# Patient Record
Sex: FEMALE | Race: WHITE | NOT HISPANIC OR LATINO | Employment: UNEMPLOYED | URBAN - METROPOLITAN AREA
[De-identification: names, ages, dates, MRNs, and addresses within clinical notes are randomized per-mention and may not be internally consistent; named-entity substitution may affect disease eponyms.]

---

## 2017-02-13 ENCOUNTER — OFFICE VISIT - HEALTHEAST (OUTPATIENT)
Dept: FAMILY MEDICINE | Facility: CLINIC | Age: 57
End: 2017-02-13

## 2017-02-13 DIAGNOSIS — J40 BRONCHITIS: ICD-10-CM

## 2017-03-29 ENCOUNTER — COMMUNICATION - HEALTHEAST (OUTPATIENT)
Dept: FAMILY MEDICINE | Facility: CLINIC | Age: 57
End: 2017-03-29

## 2017-05-19 ENCOUNTER — COMMUNICATION - HEALTHEAST (OUTPATIENT)
Dept: FAMILY MEDICINE | Facility: CLINIC | Age: 57
End: 2017-05-19

## 2017-05-19 DIAGNOSIS — E11.9 DM (DIABETES MELLITUS) (H): ICD-10-CM

## 2017-05-27 ENCOUNTER — COMMUNICATION - HEALTHEAST (OUTPATIENT)
Dept: FAMILY MEDICINE | Facility: CLINIC | Age: 57
End: 2017-05-27

## 2017-05-27 DIAGNOSIS — I63.9 CVA (CEREBRAL VASCULAR ACCIDENT) (H): ICD-10-CM

## 2017-05-27 DIAGNOSIS — G40.909 SEIZURE DISORDER (H): ICD-10-CM

## 2017-05-27 DIAGNOSIS — I77.6 VASCULITIS (H): ICD-10-CM

## 2017-05-27 DIAGNOSIS — E11.9 DIABETES MELLITUS, TYPE 2 (H): ICD-10-CM

## 2017-05-27 DIAGNOSIS — E78.5 HYPERLIPIDEMIA: ICD-10-CM

## 2017-06-05 ENCOUNTER — COMMUNICATION - HEALTHEAST (OUTPATIENT)
Dept: FAMILY MEDICINE | Facility: CLINIC | Age: 57
End: 2017-06-05

## 2017-06-05 DIAGNOSIS — E11.9 DIABETES MELLITUS, TYPE 2 (H): ICD-10-CM

## 2017-06-05 DIAGNOSIS — G40.909 SEIZURE DISORDER (H): ICD-10-CM

## 2017-06-06 ENCOUNTER — COMMUNICATION - HEALTHEAST (OUTPATIENT)
Dept: FAMILY MEDICINE | Facility: CLINIC | Age: 57
End: 2017-06-06

## 2017-06-13 ENCOUNTER — OFFICE VISIT - HEALTHEAST (OUTPATIENT)
Dept: FAMILY MEDICINE | Facility: CLINIC | Age: 57
End: 2017-06-13

## 2017-06-13 ENCOUNTER — AMBULATORY - HEALTHEAST (OUTPATIENT)
Dept: FAMILY MEDICINE | Facility: CLINIC | Age: 57
End: 2017-06-13

## 2017-06-13 DIAGNOSIS — E78.5 HYPERLIPIDEMIA: ICD-10-CM

## 2017-06-13 DIAGNOSIS — E11.9 TYPE 2 DIABETES MELLITUS (H): ICD-10-CM

## 2017-06-13 DIAGNOSIS — E11.9 DM (DIABETES MELLITUS) (H): ICD-10-CM

## 2017-06-13 DIAGNOSIS — I77.6 VASCULITIS (H): ICD-10-CM

## 2017-06-13 DIAGNOSIS — I63.9 CVA (CEREBRAL VASCULAR ACCIDENT) (H): ICD-10-CM

## 2017-06-13 DIAGNOSIS — G40.909 SEIZURE DISORDER (H): ICD-10-CM

## 2017-06-13 LAB
CHOLEST SERPL-MCNC: 237 MG/DL
FASTING STATUS PATIENT QL REPORTED: YES
HBA1C MFR BLD: 6.5 % (ref 3.5–6)
HDLC SERPL-MCNC: 51 MG/DL
LDLC SERPL CALC-MCNC: 156 MG/DL
TRIGL SERPL-MCNC: 150 MG/DL

## 2017-06-13 RX ORDER — LEVETIRACETAM 500 MG/1
500 TABLET ORAL 2 TIMES DAILY
Qty: 180 TABLET | Refills: 3 | Status: SHIPPED | OUTPATIENT
Start: 2017-06-13

## 2017-06-20 ENCOUNTER — COMMUNICATION - HEALTHEAST (OUTPATIENT)
Dept: FAMILY MEDICINE | Facility: CLINIC | Age: 57
End: 2017-06-20

## 2017-06-21 ENCOUNTER — COMMUNICATION - HEALTHEAST (OUTPATIENT)
Dept: LAB | Facility: CLINIC | Age: 57
End: 2017-06-21

## 2017-06-21 DIAGNOSIS — E83.52 HYPERCALCEMIA: ICD-10-CM

## 2017-07-03 ENCOUNTER — AMBULATORY - HEALTHEAST (OUTPATIENT)
Dept: LAB | Facility: CLINIC | Age: 57
End: 2017-07-03

## 2017-07-03 DIAGNOSIS — E83.52 HYPERCALCEMIA: ICD-10-CM

## 2017-07-05 ENCOUNTER — COMMUNICATION - HEALTHEAST (OUTPATIENT)
Dept: FAMILY MEDICINE | Facility: CLINIC | Age: 57
End: 2017-07-05

## 2017-07-06 ENCOUNTER — COMMUNICATION - HEALTHEAST (OUTPATIENT)
Dept: FAMILY MEDICINE | Facility: CLINIC | Age: 57
End: 2017-07-06

## 2017-07-28 ENCOUNTER — RECORDS - HEALTHEAST (OUTPATIENT)
Dept: ADMINISTRATIVE | Facility: OTHER | Age: 57
End: 2017-07-28

## 2017-09-22 ENCOUNTER — COMMUNICATION - HEALTHEAST (OUTPATIENT)
Dept: FAMILY MEDICINE | Facility: CLINIC | Age: 57
End: 2017-09-22

## 2017-09-22 DIAGNOSIS — E11.9 DM (DIABETES MELLITUS) (H): ICD-10-CM

## 2017-11-07 ENCOUNTER — OFFICE VISIT - HEALTHEAST (OUTPATIENT)
Dept: FAMILY MEDICINE | Facility: CLINIC | Age: 57
End: 2017-11-07

## 2017-11-07 ENCOUNTER — COMMUNICATION - HEALTHEAST (OUTPATIENT)
Dept: SCHEDULING | Facility: CLINIC | Age: 57
End: 2017-11-07

## 2017-11-07 DIAGNOSIS — A46 ERYSIPELAS: ICD-10-CM

## 2017-12-13 ENCOUNTER — RECORDS - HEALTHEAST (OUTPATIENT)
Dept: ADMINISTRATIVE | Facility: OTHER | Age: 57
End: 2017-12-13

## 2017-12-15 ENCOUNTER — OFFICE VISIT - HEALTHEAST (OUTPATIENT)
Dept: FAMILY MEDICINE | Facility: CLINIC | Age: 57
End: 2017-12-15

## 2017-12-15 ENCOUNTER — AMBULATORY - HEALTHEAST (OUTPATIENT)
Dept: FAMILY MEDICINE | Facility: CLINIC | Age: 57
End: 2017-12-15

## 2017-12-15 DIAGNOSIS — G40.909 SEIZURE DISORDER (H): ICD-10-CM

## 2017-12-15 DIAGNOSIS — E11.9 TYPE 2 DIABETES MELLITUS (H): ICD-10-CM

## 2017-12-15 DIAGNOSIS — R13.10 DYSPHAGIA: ICD-10-CM

## 2017-12-15 DIAGNOSIS — I63.9 CVA (CEREBRAL VASCULAR ACCIDENT) (H): ICD-10-CM

## 2017-12-15 DIAGNOSIS — E78.5 HYPERLIPIDEMIA: ICD-10-CM

## 2017-12-15 DIAGNOSIS — I77.6 VASCULITIS (H): ICD-10-CM

## 2017-12-15 LAB — HBA1C MFR BLD: 6.1 % (ref 3.5–6)

## 2017-12-22 ENCOUNTER — HOSPITAL ENCOUNTER (OUTPATIENT)
Dept: RADIOLOGY | Facility: HOSPITAL | Age: 57
Discharge: HOME | End: 2017-12-22
Attending: FAMILY MEDICINE

## 2017-12-22 DIAGNOSIS — R13.10 DYSPHAGIA: ICD-10-CM

## 2017-12-22 DIAGNOSIS — I63.9 CVA (CEREBRAL VASCULAR ACCIDENT) (H): ICD-10-CM

## 2017-12-26 ENCOUNTER — COMMUNICATION - HEALTHEAST (OUTPATIENT)
Dept: FAMILY MEDICINE | Facility: CLINIC | Age: 57
End: 2017-12-26

## 2018-03-12 ENCOUNTER — COMMUNICATION - HEALTHEAST (OUTPATIENT)
Dept: LAB | Facility: CLINIC | Age: 58
End: 2018-03-12

## 2018-03-12 DIAGNOSIS — E11.9 TYPE 2 DIABETES MELLITUS (H): ICD-10-CM

## 2018-03-20 ENCOUNTER — COMMUNICATION - HEALTHEAST (OUTPATIENT)
Dept: FAMILY MEDICINE | Facility: CLINIC | Age: 58
End: 2018-03-20

## 2018-03-20 ENCOUNTER — AMBULATORY - HEALTHEAST (OUTPATIENT)
Dept: LAB | Facility: CLINIC | Age: 58
End: 2018-03-20

## 2018-03-20 DIAGNOSIS — E11.9 TYPE 2 DIABETES MELLITUS (H): ICD-10-CM

## 2018-03-20 LAB — HBA1C MFR BLD: 6.7 % (ref 3.5–6)

## 2018-06-20 ENCOUNTER — COMMUNICATION - HEALTHEAST (OUTPATIENT)
Dept: FAMILY MEDICINE | Facility: CLINIC | Age: 58
End: 2018-06-20

## 2018-06-20 DIAGNOSIS — E11.9 DM (DIABETES MELLITUS) (H): ICD-10-CM

## 2018-09-27 ENCOUNTER — COMMUNICATION - HEALTHEAST (OUTPATIENT)
Dept: FAMILY MEDICINE | Facility: CLINIC | Age: 58
End: 2018-09-27

## 2018-09-27 DIAGNOSIS — Z86.73 HISTORY OF CVA (CEREBROVASCULAR ACCIDENT): ICD-10-CM

## 2018-10-03 ENCOUNTER — COMMUNICATION - HEALTHEAST (OUTPATIENT)
Dept: FAMILY MEDICINE | Facility: CLINIC | Age: 58
End: 2018-10-03

## 2018-12-21 ENCOUNTER — COMMUNICATION - HEALTHEAST (OUTPATIENT)
Dept: FAMILY MEDICINE | Facility: CLINIC | Age: 58
End: 2018-12-21

## 2018-12-21 DIAGNOSIS — E11.9 DM (DIABETES MELLITUS) (H): ICD-10-CM

## 2019-01-07 ENCOUNTER — COMMUNICATION - HEALTHEAST (OUTPATIENT)
Dept: FAMILY MEDICINE | Facility: CLINIC | Age: 59
End: 2019-01-07

## 2019-01-07 DIAGNOSIS — E11.9 DM (DIABETES MELLITUS) (H): ICD-10-CM

## 2019-02-05 ENCOUNTER — RECORDS - HEALTHEAST (OUTPATIENT)
Dept: ADMINISTRATIVE | Facility: OTHER | Age: 59
End: 2019-02-05

## 2019-02-14 ENCOUNTER — RECORDS - HEALTHEAST (OUTPATIENT)
Dept: HEALTH INFORMATION MANAGEMENT | Facility: CLINIC | Age: 59
End: 2019-02-14

## 2019-03-11 ENCOUNTER — OFFICE VISIT - HEALTHEAST (OUTPATIENT)
Dept: FAMILY MEDICINE | Facility: CLINIC | Age: 59
End: 2019-03-11

## 2019-03-11 DIAGNOSIS — E11.9 TYPE 2 DIABETES MELLITUS WITHOUT COMPLICATION, WITHOUT LONG-TERM CURRENT USE OF INSULIN (H): ICD-10-CM

## 2019-03-11 DIAGNOSIS — G40.909 SEIZURE DISORDER (H): ICD-10-CM

## 2019-03-11 DIAGNOSIS — E78.49 OTHER HYPERLIPIDEMIA: ICD-10-CM

## 2019-03-11 DIAGNOSIS — Z86.73 HISTORY OF CVA (CEREBROVASCULAR ACCIDENT): ICD-10-CM

## 2019-03-11 DIAGNOSIS — R13.10 DYSPHAGIA, UNSPECIFIED TYPE: ICD-10-CM

## 2019-03-11 LAB
FASTING STATUS PATIENT QL REPORTED: ABNORMAL
GLUCOSE BLD-MCNC: 360 MG/DL (ref 70–125)
HBA1C MFR BLD: >14 % (ref 3.5–6)

## 2019-03-11 ASSESSMENT — MIFFLIN-ST. JEOR: SCORE: 1330.2

## 2019-03-12 RX ORDER — GLUCOSAMINE HCL/CHONDROITIN SU 500-400 MG
CAPSULE ORAL
Qty: 100 STRIP | Refills: 11 | Status: SHIPPED | OUTPATIENT
Start: 2019-03-12

## 2019-04-12 ENCOUNTER — OFFICE VISIT - HEALTHEAST (OUTPATIENT)
Dept: FAMILY MEDICINE | Facility: CLINIC | Age: 59
End: 2019-04-12

## 2019-04-12 DIAGNOSIS — E11.9 TYPE 2 DIABETES MELLITUS WITHOUT COMPLICATION, WITHOUT LONG-TERM CURRENT USE OF INSULIN (H): ICD-10-CM

## 2019-04-12 DIAGNOSIS — R21 RASH AND NONSPECIFIC SKIN ERUPTION: ICD-10-CM

## 2019-04-24 ENCOUNTER — RECORDS - HEALTHEAST (OUTPATIENT)
Dept: ADMINISTRATIVE | Facility: OTHER | Age: 59
End: 2019-04-24

## 2019-06-13 ENCOUNTER — AMBULATORY - HEALTHEAST (OUTPATIENT)
Dept: LAB | Facility: CLINIC | Age: 59
End: 2019-06-13

## 2019-06-13 DIAGNOSIS — E11.9 TYPE 2 DIABETES MELLITUS WITHOUT COMPLICATION, WITHOUT LONG-TERM CURRENT USE OF INSULIN (H): ICD-10-CM

## 2019-06-13 LAB
ALBUMIN SERPL-MCNC: 4.1 G/DL (ref 3.5–5)
ALP SERPL-CCNC: 52 U/L (ref 45–120)
ALT SERPL W P-5'-P-CCNC: 41 U/L (ref 0–45)
ANION GAP SERPL CALCULATED.3IONS-SCNC: 11 MMOL/L (ref 5–18)
AST SERPL W P-5'-P-CCNC: 23 U/L (ref 0–40)
BILIRUB SERPL-MCNC: 0.9 MG/DL (ref 0–1)
BUN SERPL-MCNC: 12 MG/DL (ref 8–22)
CALCIUM SERPL-MCNC: 10.6 MG/DL (ref 8.5–10.5)
CHLORIDE BLD-SCNC: 101 MMOL/L (ref 98–107)
CHOLEST SERPL-MCNC: 214 MG/DL
CO2 SERPL-SCNC: 28 MMOL/L (ref 22–31)
CREAT SERPL-MCNC: 0.85 MG/DL (ref 0.6–1.1)
FASTING STATUS PATIENT QL REPORTED: NO
GFR SERPL CREATININE-BSD FRML MDRD: >60 ML/MIN/1.73M2
GLUCOSE BLD-MCNC: 212 MG/DL (ref 70–125)
HBA1C MFR BLD: 7.9 % (ref 3.5–6)
HDLC SERPL-MCNC: 46 MG/DL
LDLC SERPL CALC-MCNC: 120 MG/DL
POTASSIUM BLD-SCNC: 4.6 MMOL/L (ref 3.5–5)
PROT SERPL-MCNC: 7.1 G/DL (ref 6–8)
SODIUM SERPL-SCNC: 140 MMOL/L (ref 136–145)
TRIGL SERPL-MCNC: 238 MG/DL

## 2019-06-15 ENCOUNTER — COMMUNICATION - HEALTHEAST (OUTPATIENT)
Dept: FAMILY MEDICINE | Facility: CLINIC | Age: 59
End: 2019-06-15

## 2019-06-15 DIAGNOSIS — E11.9 DM (DIABETES MELLITUS) (H): ICD-10-CM

## 2019-06-16 ENCOUNTER — COMMUNICATION - HEALTHEAST (OUTPATIENT)
Dept: FAMILY MEDICINE | Facility: CLINIC | Age: 59
End: 2019-06-16

## 2019-08-29 ENCOUNTER — COMMUNICATION - HEALTHEAST (OUTPATIENT)
Dept: FAMILY MEDICINE | Facility: CLINIC | Age: 59
End: 2019-08-29

## 2019-09-05 ENCOUNTER — COMMUNICATION - HEALTHEAST (OUTPATIENT)
Dept: FAMILY MEDICINE | Facility: CLINIC | Age: 59
End: 2019-09-05

## 2019-09-23 ENCOUNTER — OFFICE VISIT - HEALTHEAST (OUTPATIENT)
Dept: FAMILY MEDICINE | Facility: CLINIC | Age: 59
End: 2019-09-23

## 2019-09-23 DIAGNOSIS — E78.2 MIXED HYPERLIPIDEMIA: ICD-10-CM

## 2019-09-23 DIAGNOSIS — E11.9 DIABETES MELLITUS, TYPE 2 (H): ICD-10-CM

## 2019-09-23 DIAGNOSIS — G40.909 SEIZURE DISORDER (H): ICD-10-CM

## 2019-09-23 LAB — HBA1C MFR BLD: 9.3 % (ref 3.5–6)

## 2019-09-23 ASSESSMENT — MIFFLIN-ST. JEOR: SCORE: 1396.43

## 2019-10-19 ENCOUNTER — RECORDS - HEALTHEAST (OUTPATIENT)
Dept: ADMINISTRATIVE | Facility: OTHER | Age: 59
End: 2019-10-19

## 2019-11-29 ENCOUNTER — COMMUNICATION - HEALTHEAST (OUTPATIENT)
Dept: FAMILY MEDICINE | Facility: CLINIC | Age: 59
End: 2019-11-29

## 2019-11-29 DIAGNOSIS — E11.9 DIABETES MELLITUS, TYPE 2 (H): ICD-10-CM

## 2020-01-13 ENCOUNTER — RECORDS - HEALTHEAST (OUTPATIENT)
Dept: ADMINISTRATIVE | Facility: OTHER | Age: 60
End: 2020-01-13

## 2020-02-18 ENCOUNTER — RECORDS - HEALTHEAST (OUTPATIENT)
Dept: ADMINISTRATIVE | Facility: OTHER | Age: 60
End: 2020-02-18

## 2020-02-18 LAB — RETINOPATHY: NEGATIVE

## 2020-02-26 ENCOUNTER — RECORDS - HEALTHEAST (OUTPATIENT)
Dept: HEALTH INFORMATION MANAGEMENT | Facility: CLINIC | Age: 60
End: 2020-02-26

## 2020-03-13 ENCOUNTER — COMMUNICATION - HEALTHEAST (OUTPATIENT)
Dept: FAMILY MEDICINE | Facility: CLINIC | Age: 60
End: 2020-03-13

## 2020-03-13 DIAGNOSIS — E11.9 DM (DIABETES MELLITUS) (H): ICD-10-CM

## 2020-04-03 ENCOUNTER — OFFICE VISIT - HEALTHEAST (OUTPATIENT)
Dept: FAMILY MEDICINE | Facility: CLINIC | Age: 60
End: 2020-04-03

## 2020-04-03 ENCOUNTER — AMBULATORY - HEALTHEAST (OUTPATIENT)
Dept: FAMILY MEDICINE | Facility: CLINIC | Age: 60
End: 2020-04-03

## 2020-04-03 DIAGNOSIS — Z86.73 HISTORY OF CVA (CEREBROVASCULAR ACCIDENT): ICD-10-CM

## 2020-04-03 DIAGNOSIS — E78.2 MIXED HYPERLIPIDEMIA: ICD-10-CM

## 2020-04-03 DIAGNOSIS — E11.9 TYPE 2 DIABETES MELLITUS WITHOUT COMPLICATION, WITHOUT LONG-TERM CURRENT USE OF INSULIN (H): ICD-10-CM

## 2020-04-03 RX ORDER — BETAMETHASONE DIPROPIONATE 0.5 MG/G
OINTMENT, AUGMENTED TOPICAL
Status: SHIPPED | COMMUNITY
Start: 2019-04-24 | End: 2023-12-29

## 2020-04-03 RX ORDER — GLIPIZIDE 2.5 MG/1
2.5 TABLET, EXTENDED RELEASE ORAL DAILY
Qty: 30 TABLET | Refills: 11 | Status: SHIPPED | OUTPATIENT
Start: 2020-04-03 | End: 2022-02-04 | Stop reason: DRUGHIGH

## 2020-04-03 RX ORDER — TRIAMCINOLONE ACETONIDE 1 MG/G
CREAM TOPICAL
Status: SHIPPED | COMMUNITY
Start: 2019-03-11 | End: 2024-09-26

## 2020-09-09 ENCOUNTER — COMMUNICATION - HEALTHEAST (OUTPATIENT)
Dept: FAMILY MEDICINE | Facility: CLINIC | Age: 60
End: 2020-09-09

## 2020-09-09 DIAGNOSIS — E11.9 DM (DIABETES MELLITUS) (H): ICD-10-CM

## 2020-11-19 ENCOUNTER — COMMUNICATION - HEALTHEAST (OUTPATIENT)
Dept: FAMILY MEDICINE | Facility: CLINIC | Age: 60
End: 2020-11-19

## 2020-11-19 DIAGNOSIS — E11.9 DIABETES MELLITUS, TYPE 2 (H): ICD-10-CM

## 2020-12-14 ENCOUNTER — COMMUNICATION - HEALTHEAST (OUTPATIENT)
Dept: FAMILY MEDICINE | Facility: CLINIC | Age: 60
End: 2020-12-14

## 2020-12-14 DIAGNOSIS — E11.9 DM (DIABETES MELLITUS) (H): ICD-10-CM

## 2021-01-11 ENCOUNTER — COMMUNICATION - HEALTHEAST (OUTPATIENT)
Dept: FAMILY MEDICINE | Facility: CLINIC | Age: 61
End: 2021-01-11

## 2021-01-11 DIAGNOSIS — E11.9 TYPE 2 DIABETES MELLITUS WITHOUT COMPLICATION, WITHOUT LONG-TERM CURRENT USE OF INSULIN (H): ICD-10-CM

## 2021-01-15 ENCOUNTER — RECORDS - HEALTHEAST (OUTPATIENT)
Dept: ADMINISTRATIVE | Facility: OTHER | Age: 61
End: 2021-01-15

## 2021-01-18 ENCOUNTER — COMMUNICATION - HEALTHEAST (OUTPATIENT)
Dept: FAMILY MEDICINE | Facility: CLINIC | Age: 61
End: 2021-01-18

## 2021-01-18 DIAGNOSIS — E11.9 TYPE 2 DIABETES MELLITUS WITHOUT COMPLICATION, WITHOUT LONG-TERM CURRENT USE OF INSULIN (H): ICD-10-CM

## 2021-03-11 ENCOUNTER — COMMUNICATION - HEALTHEAST (OUTPATIENT)
Dept: FAMILY MEDICINE | Facility: CLINIC | Age: 61
End: 2021-03-11

## 2021-03-11 DIAGNOSIS — E11.9 DM (DIABETES MELLITUS) (H): ICD-10-CM

## 2021-03-12 ENCOUNTER — OFFICE VISIT - HEALTHEAST (OUTPATIENT)
Dept: FAMILY MEDICINE | Facility: CLINIC | Age: 61
End: 2021-03-12

## 2021-03-12 DIAGNOSIS — Z86.73 HISTORY OF CVA (CEREBROVASCULAR ACCIDENT): ICD-10-CM

## 2021-03-12 DIAGNOSIS — E11.9 TYPE 2 DIABETES MELLITUS WITHOUT COMPLICATION, WITHOUT LONG-TERM CURRENT USE OF INSULIN (H): ICD-10-CM

## 2021-03-12 DIAGNOSIS — G40.909 SEIZURE DISORDER (H): ICD-10-CM

## 2021-03-12 LAB
ALBUMIN SERPL-MCNC: 3.8 G/DL (ref 3.5–5)
ALP SERPL-CCNC: 83 U/L (ref 45–120)
ALT SERPL W P-5'-P-CCNC: 45 U/L (ref 0–45)
ANION GAP SERPL CALCULATED.3IONS-SCNC: 11 MMOL/L (ref 5–18)
AST SERPL W P-5'-P-CCNC: 33 U/L (ref 0–40)
BILIRUB SERPL-MCNC: 0.8 MG/DL (ref 0–1)
BUN SERPL-MCNC: 15 MG/DL (ref 8–22)
CALCIUM SERPL-MCNC: 9.5 MG/DL (ref 8.5–10.5)
CHLORIDE BLD-SCNC: 95 MMOL/L (ref 98–107)
CO2 SERPL-SCNC: 28 MMOL/L (ref 22–31)
CREAT SERPL-MCNC: 0.95 MG/DL (ref 0.6–1.1)
CREAT SERPL-MCNC: 0.95 MG/DL (ref 0.6–1.1)
ERYTHROCYTE [DISTWIDTH] IN BLOOD BY AUTOMATED COUNT: 12.1 % (ref 11–14.5)
GFR SERPL CREATININE-BSD FRML MDRD: 60 ML/MIN/1.73M2
GFR SERPL CREATININE-BSD FRML MDRD: 60 ML/MIN/1.73M2
GLUCOSE BLD-MCNC: 451 MG/DL (ref 70–125)
HBA1C MFR BLD: 10.9 %
HCT VFR BLD AUTO: 43.6 % (ref 35–47)
HGB BLD-MCNC: 14.6 G/DL (ref 12–16)
LEVETIRACETAM (KEPPRA): 18.5 UG/ML (ref 6–46)
MCH RBC QN AUTO: 30.4 PG (ref 27–34)
MCHC RBC AUTO-ENTMCNC: 33.5 G/DL (ref 32–36)
MCV RBC AUTO: 91 FL (ref 80–100)
PLATELET # BLD AUTO: 245 THOU/UL (ref 140–440)
PMV BLD AUTO: 10.1 FL (ref 7–10)
POTASSIUM BLD-SCNC: 4.7 MMOL/L (ref 3.5–5)
PROT SERPL-MCNC: 7 G/DL (ref 6–8)
RBC # BLD AUTO: 4.8 MILL/UL (ref 3.8–5.4)
SODIUM SERPL-SCNC: 134 MMOL/L (ref 136–145)
WBC: 7.2 THOU/UL (ref 4–11)

## 2021-03-12 RX ORDER — GLIPIZIDE 5 MG/1
5 TABLET, FILM COATED, EXTENDED RELEASE ORAL
Qty: 90 TABLET | Refills: 3 | Status: SHIPPED | OUTPATIENT
Start: 2021-03-12 | End: 2022-01-03

## 2021-03-12 RX ORDER — PIOGLITAZONEHYDROCHLORIDE 15 MG/1
15 TABLET ORAL DAILY
Qty: 30 TABLET | Refills: 11 | Status: SHIPPED | OUTPATIENT
Start: 2021-03-12 | End: 2022-02-04 | Stop reason: DRUGHIGH

## 2021-03-15 ENCOUNTER — COMMUNICATION - HEALTHEAST (OUTPATIENT)
Dept: FAMILY MEDICINE | Facility: CLINIC | Age: 61
End: 2021-03-15

## 2021-03-17 ENCOUNTER — COMMUNICATION - HEALTHEAST (OUTPATIENT)
Dept: FAMILY MEDICINE | Facility: CLINIC | Age: 61
End: 2021-03-17

## 2021-05-27 NOTE — PROGRESS NOTES
Patient ID: Maranda Thrasher is a 58 y.o. female.  /74   Pulse 80   SpO2 97%     Assessment/Plan:                Diagnoses and all orders for this visit:    Type 2 diabetes mellitus without complication, without long-term current use of insulin (H)  -     Comprehensive Metabolic Panel; Future; Expected date: 06/12/2019  -     Lipid [place] FASTING; Future; Expected date: 06/12/2019  -     Glycosylated Hemoglobin A1c; Future; Expected date: 06/12/2019    Rash and nonspecific skin eruption  -     Ambulatory referral to Dermatology; Future; Expected date: 06/12/2019      DISCUSSION  Continue current management of diabetes with use of metformin alone.  Hold glipizide.  Return in 2 months for recheck of labs only.  Referral to dermatology for skin rash  Subjective:     HPI    Maranda Thrasher is a 58 y.o. female was a complex medical history was here 1 month ago with significant hyperglycemia having gone off of her diabetic medications.  She was restarted on metformin and glipizide.  Glipizide seem to cause concerns for low blood sugar although when patient had symptoms blood sugar was recorded at 92.  Given her previous levels of 300 or more this probably represented a significant drop to get down into the 90 range.  Her  made the decision to forego further use of the glipizide and she has been on the metformin.  They report readings that range from 100 -130 in the morning with most typical readings around 100-110.  Readings are most often taken because of logistical factors after the evening meal and they tend to be around 130 up to 150.  Discussed at the readings they are reporting are quite ideal.  She has made dietary changes.  She does not notice any frequent urination excessive hunger or thirst.  Discussed continuing to monitor and continuing to just take the metformin given the concern for the sensation of low blood sugar and the favorable numbers on just the metformin alone discussed returning in 2  additional months which would be 3 months from prior for a reassessment of her A1c and other lab tests at that time.  Future orders are placed.    She does have a skin rash for which we tried topical medium low potency treatment without any significant improvement.  The rash consists of bumps that are prominent on the hands but also present on the legs and neck region.  It has somewhat of an unusual appearance but does not resemble cancer and infection or other significant process.    Review of Systems  Complete review of systems is obtained.  Other than the specific considerations noted above complete review of systems is negative.        Objective:   Medications:  Current Outpatient Medications   Medication Sig Note     aspirin 81 MG EC tablet Take 81 mg by mouth daily.      b complex vitamins tablet Take 1 tablet by mouth daily.      blood glucose test (ACCU-CHEK CIARA PLUS TEST STRP) strips Used to check blood sugar once daily for management of type 2 diabetes.  Dispense brand per patient's insurance at pharmacy discretion.      blood-glucose meter Misc Used to check blood sugar once daily for management of type 2 diabetes      generic lancets (FINGERSTIX LANCETS) Used to check blood sugar once daily for management of type 2 diabetes. dispense brand per patient's insurance at pharmacy discretion.      latanoprost (XALATAN) 0.005 % ophthalmic solution  6/9/2016: Received from: External Pharmacy     levETIRAcetam (KEPPRA) 500 MG tablet Take 1 tablet (500 mg total) by mouth 2 (two) times a day.      lisinopril (PRINIVIL,ZESTRIL) 2.5 MG tablet Take 1 tablet (2.5 mg total) by mouth daily.      metFORMIN (GLUCOPHAGE) 1000 MG tablet Take 1 tablet (1,000 mg total) by mouth 2 (two) times a day with meals.      triamcinolone (KENALOG) 0.1 % cream Apply topically 2 (two) times a day.      Allergies:  Allergies   Allergen Reactions     Penicillins      Tobacco:   reports that she has never smoked. She has never used  smokeless tobacco.     Physical Exam      /74   Pulse 80   SpO2 97%       General: Patient alert no signs of distress    Skin: Raised red papular nodular lesions on the right hand near the base of the second and third digits.  There are cluster about 3-4 such lumps.  There are a few scattered satellite lesions around this area.  There is some similar lesions on the fingers more distally.  There is a papular type rash present on the legs to the anterior shins just below the level of the similar in appearance but not as distinct as that on the hands.  Also some scattered papular nodular type lesions on the itself.

## 2021-05-29 NOTE — TELEPHONE ENCOUNTER
Refill Approved    Rx renewed per Medication Renewal Policy. Medication was last renewed on 12/26/18.    Trudi Noriega, [facility] Connection Triage/Med Refill 6/17/2019     Requested Prescriptions   Pending Prescriptions Disp Refills     lisinopril (PRINIVIL,ZESTRIL) 2.5 MG tablet [Pharmacy Med Name: LISINOPRIL 2.5MG TABLETS] 30 tablet 0     Sig: TAKE 1 TABLET BY MOUTH EVERY DAY       Ace Inhibitors Refill Protocol Passed - 6/15/2019  2:49 PM        Passed - PCP or prescribing provider visit in past 12 months       Last office visit with prescriber/PCP: Visit date not found OR same dept: 4/12/2019 Nir Barton MD OR same specialty: 4/12/2019 Nir Barton MD  Last physical: Visit date not found Last MTM visit: Visit date not found   Next visit within 3 mo: Visit date not found  Next physical within 3 mo: Visit date not found  Prescriber OR PCP: oT Dooley MD  Last diagnosis associated with med order: 1. DM (diabetes mellitus) (H)  - lisinopril (PRINIVIL,ZESTRIL) 2.5 MG tablet [Pharmacy Med Name: LISINOPRIL 2.5MG TABLETS]; TAKE 1 TABLET BY MOUTH EVERY DAY  Dispense: 30 tablet; Refill: 0    If protocol passes may refill for 12 months if within 3 months of last provider visit (or a total of 15 months).             Passed - Serum Potassium in past 12 months     Lab Results   Component Value Date    Potassium 4.6 06/13/2019             Passed - Blood pressure filed in past 12 months     BP Readings from Last 1 Encounters:   04/12/19 126/74             Passed - Serum Creatinine in past 12 months     Creatinine   Date Value Ref Range Status   06/13/2019 0.85 0.60 - 1.10 mg/dL Final

## 2021-05-30 VITALS — WEIGHT: 183.4 LBS | BODY MASS INDEX: 32.49 KG/M2

## 2021-05-31 VITALS — WEIGHT: 161 LBS | BODY MASS INDEX: 28.52 KG/M2

## 2021-05-31 VITALS — BODY MASS INDEX: 30.29 KG/M2 | WEIGHT: 171 LBS

## 2021-05-31 VITALS — BODY MASS INDEX: 29.23 KG/M2 | WEIGHT: 165 LBS

## 2021-06-01 NOTE — PROGRESS NOTES
Assessment and Plan:     1. Diabetes mellitus, type 2 (H)  Glycosylated Hemoglobin A1c    Ambulatory referral to Medication Management    Ambulatory referral to Endocrinology    metFORMIN (GLUCOPHAGE) 1000 MG tablet   2. Mixed hyperlipidemia     3. Seizure disorder (H)       Patient's A1c has increased to 9.3%.  Will refer to [facility] pharmacist and endocrinology for further evaluation and discussion of treatment options.  I encouraged her to consume a healthy diet and exercise.  She will continue metformin.  She did not tolerate glipizide.  She is not fasting today.  I encouraged her to follow-up for fasting cholesterol check.  Discussed initiation of statin, but she declines.  She continues Keppra for her seizure disorder.  She will follow with Dr. Barton in 3 months for medication management or sooner with any further concerns. I spent 25 minutes with the patient with greater than 50% spent discussing symptoms, treatment options, and coordination of care.       Subjective:     Maranda is a 58 y.o. female presenting to the clinic with her  for diabetes check.  She has multiple comorbidities including type 2 diabetes, CVA, hyperlipidemia, seizure disorder.  Patient does not check her blood sugars.  She is consuming a healthy diet.  She is not currently exercising, but plans on walking outdoors in the fall.  Her last eye exam was 2 months ago.  She denies any sores on her feet.  Last hemoglobin A1c was 7.9% on 6/13/2019.  Patient was experiencing episodes of hypoglycemia while taking both regular release and extended release glipizide.  The Glipzide was discontinued and she is currently taking metformin 1000 mg twice daily.  She is tolerating the medication well without any side effects.  She is not fasting today.  Patient has a history of multiple strokes 10 years ago.  She takes Keppra for seizure disorder.  She has not had a seizure since 2011.  She continues to see neurology.    Review of Systems: A complete 14  "point review of systems was obtained and is negative or as stated in the history of present illness.    Social History     Socioeconomic History     Marital status:      Spouse name: Not on file     Number of children: Not on file     Years of education: Not on file     Highest education level: Not on file   Occupational History     Not on file   Social Needs     Financial resource strain: Not on file     Food insecurity:     Worry: Not on file     Inability: Not on file     Transportation needs:     Medical: Not on file     Non-medical: Not on file   Tobacco Use     Smoking status: Never Smoker     Smokeless tobacco: Never Used   Substance and Sexual Activity     Alcohol use: Not on file     Drug use: Not on file     Sexual activity: Not on file   Lifestyle     Physical activity:     Days per week: Not on file     Minutes per session: Not on file     Stress: Not on file   Relationships     Social connections:     Talks on phone: Not on file     Gets together: Not on file     Attends [employer] service: Not on file     Active member of club or organization: Not on file     Attends meetings of clubs or organizations: Not on file     Relationship status: Not on file     Intimate partner violence:     Fear of current or ex partner: Not on file     Emotionally abused: Not on file     Physically abused: Not on file     Forced sexual activity: Not on file   Other Topics Concern     Not on file   Social History Narrative     Not on file       Active Ambulatory Problems     Diagnosis Date Noted     Diabetes mellitus, type 2 (H) 05/29/2017     CVA (cerebral vascular accident) (H) 05/29/2017     Vasculitis (H) 05/29/2017     Hyperlipidemia 05/29/2017     Seizure disorder (H) 05/29/2017     Resolved Ambulatory Problems     Diagnosis Date Noted     No Resolved Ambulatory Problems     No Additional Past Medical History       No family history on file.    Objective:     /90   Pulse 91   Ht 5' 3\" (1.6 m)   Wt 189 lb " (85.7 kg)   SpO2 97%   BMI 33.48 kg/m      Patient is alert, in no obvious distress.   Skin: Warm, dry.  No lesions or rashes.  Skin turgor rapid return.   HEENT:  Head normocephalic, atraumatic.  Eyes normal. Ears normal.  Nose patent, mucosa pink.  Oropharynx mucosa pink.  No lesions or tonsillar enlargement.   Neck: Supple, no lymphadenopathy. No thyromegaly.  Lungs:  Clear to auscultation. Respirations even and unlabored.  No wheezing or rales noted.   Heart:  Regular rate and rhythm.  No murmurs, S3, S4, gallops, or rubs.    Abdomen: Soft, nontender.  No organomegaly. Bowel sounds normoactive. No guarding or masses noted.

## 2021-06-02 VITALS — WEIGHT: 174.4 LBS | BODY MASS INDEX: 30.9 KG/M2 | HEIGHT: 63 IN

## 2021-06-03 VITALS
HEIGHT: 63 IN | HEART RATE: 91 BPM | BODY MASS INDEX: 33.49 KG/M2 | DIASTOLIC BLOOD PRESSURE: 90 MMHG | OXYGEN SATURATION: 97 % | SYSTOLIC BLOOD PRESSURE: 132 MMHG | WEIGHT: 189 LBS

## 2021-06-03 NOTE — TELEPHONE ENCOUNTER
Refill Request  Did you contact pharmacy: No  Medication name:   Requested Prescriptions     Pending Prescriptions Disp Refills     metFORMIN (GLUCOPHAGE) 1000 MG tablet 180 tablet 0     Sig: Take 1 tablet (1,000 mg total) by mouth 2 (two) times a day with meals.     Who prescribed the medication:   Arline Barnes CNP  Pharmacy Name and Location:   [id number]  Is patient out of medication: No.  5 days left  Patient notified refills processed in 72 hours:  yes  Okay to leave a detailed message: yes

## 2021-06-03 NOTE — TELEPHONE ENCOUNTER
RN cannot approve Refill Request    RN can NOT refill this medication Protocol failed and NO refill given.       Trudi Noriega, [facility] Triage/Med Refill 11/30/2019    Requested Prescriptions   Pending Prescriptions Disp Refills     metFORMIN (GLUCOPHAGE) 1000 MG tablet 180 tablet 3     Sig: Take 1 tablet (1,000 mg total) by mouth 2 (two) times a day with meals.       Metformin Refill Protocol Failed - 11/29/2019  2:34 PM        Failed - Microalbumin in last year      No results found for: MICROALBUR               Passed - Blood pressure in last 12 months     BP Readings from Last 1 Encounters:   09/23/19 132/90             Passed - LFT or AST or ALT in last 12 months     Albumin   Date Value Ref Range Status   06/13/2019 4.1 3.5 - 5.0 g/dL Final     Bilirubin, Total   Date Value Ref Range Status   06/13/2019 0.9 0.0 - 1.0 mg/dL Final     Alkaline Phosphatase   Date Value Ref Range Status   06/13/2019 52 45 - 120 U/L Final     AST   Date Value Ref Range Status   06/13/2019 23 0 - 40 U/L Final     ALT   Date Value Ref Range Status   06/13/2019 41 0 - 45 U/L Final     Protein, Total   Date Value Ref Range Status   06/13/2019 7.1 6.0 - 8.0 g/dL Final                Passed - GFR or Serum Creatinine in last 6 months     GFR MDRD Non Af Amer   Date Value Ref Range Status   06/13/2019 >60 >60 mL/min/1.73m2 Final     GFR MDRD Af Amer   Date Value Ref Range Status   06/13/2019 >60 >60 mL/min/1.73m2 Final             Passed - Visit with PCP or prescribing provider visit in last 6 months or next 3 months     Last office visit with prescriber/PCP: Visit date not found OR same dept: 9/23/2019 Arline Barnes CNP OR same specialty: 9/23/2019 Arline Barnes CNP Last physical: Visit date not found Last MTM visit: Visit date not found         Next appt within 3 mo: Visit date not found  Next physical within 3 mo: Visit date not found  Prescriber OR PCP: Nir Barton MD  Last diagnosis associated with med order: 1.  Diabetes mellitus, type 2 (H)  - metFORMIN (GLUCOPHAGE) 1000 MG tablet; Take 1 tablet (1,000 mg total) by mouth 2 (two) times a day with meals.  Dispense: 180 tablet; Refill: 0     If protocol passes may refill for 12 months if within 3 months of last provider visit (or a total of 15 months).           Passed - A1C in last 6 months     Hemoglobin A1c   Date Value Ref Range Status   09/23/2019 9.3 (H) 3.5 - 6.0 % Final

## 2021-06-05 VITALS
SYSTOLIC BLOOD PRESSURE: 128 MMHG | DIASTOLIC BLOOD PRESSURE: 76 MMHG | OXYGEN SATURATION: 98 % | BODY MASS INDEX: 32.06 KG/M2 | HEART RATE: 87 BPM | WEIGHT: 181 LBS

## 2021-06-07 NOTE — PROGRESS NOTES
"Maranda Thrasher is a 59 y.o. female who is being evaluated via a billable telephone visit.      The patient has been notified of following:     \"This telephone visit will be conducted via a call between you and your physician/provider. We have found that certain health care needs can be provided without the need for a physical exam.  This service lets us provide the care you need with a short phone conversation.  If a prescription is necessary we can send it directly to your pharmacy.  If lab work is needed we can place an order for that and you can then stop by our lab to have the test done at a later time.    If during the course of the call the physician/provider feels a telephone visit is not appropriate, you will not be charged for this service.\"     Patient has given verbal consent to a Telephone visit? Yes    Maranda Thrasher complains of    Chief Complaint   Patient presents with     Diabetes     Medication Management       I have reviewed and updated the patient's Past Medical History, Social History, Family History and Medication List.    ALLERGIES  Penicillins    She has somewhat of a complex history and that she has a history of stroke that is believed to been caused by vasculitis.  Also has a history of seizure disorder.  She was seen and followed by her neurologist this past winter.  The consultation note is reviewed.  Ultimately no significant concerns currently related to her stroke.  She is on reasonable medication management although she has declined any consideration for statin therapy and continues to do so.  She does have residual effects from her stroke that do affect movement, vision and mentation.    She has type 2 diabetes.  She is currently on metformin.  Most recent A1c 9.3.  Minimal blood sugar checks although the last 2 mornings a checked and found that is around 160.  She has been on metformin and glipizide both for management however glipizide in the past has created concerns for " hypoglycemia.  Details of this are largely unknown because checking has been minimal.  Her A1c's have fluctuated between being well controlled to be greater than 14.  Currently they report dietary considerations are not ideal.  Patient denies all right symptoms of hyperglycemia such as polyuria polydipsia polyphagia.  She continues to take metformin and reports no side effects or concerns.  She did have an eye exam this past winter.  She continues to deny any symptoms of neuropathy.  She is on low-dose ACE inhibitor but has never left a urine sample for microalbumin testing.  She does not have any history of kidney disease based on blood test measurements.    Today we discussed based on available information optimization of her diabetic control.  I do believe she would benefit from additional medication therapy.  I think it is reasonable to restart glipizide extended release at 2.5 mg once daily.  Encouraged him to try to check blood sugars as this will be helpful in deciding on further course of action in the future.  Reinforced the importance of dietary considerations for managing diabetes.    Assessment/Plan:      Diagnoses and all orders for this visit:    Type 2 diabetes mellitus without complication, without long-term current use of insulin (H)  -     glipiZIDE (GLUCOTROL XL) 2.5 MG 24 hr tablet; Take 1 tablet (2.5 mg total) by mouth daily.  Dispense: 30 tablet; Refill: 11    Mixed hyperlipidemia    History of CVA (cerebrovascular accident)          Start glipizide as above.  See discussion above.  Follow-up with me in 3 months to recheck A1c and meet in person for a physical exam if possible.  Call with concerns prior.  Monitor for potential hypoglycemia if this occurs we will need to look at other medication options.      Phone call duration: 14 minutes    Nir Barton MD

## 2021-06-08 ENCOUNTER — COMMUNICATION - HEALTHEAST (OUTPATIENT)
Dept: LAB | Facility: CLINIC | Age: 61
End: 2021-06-08

## 2021-06-08 DIAGNOSIS — E11.9 TYPE 2 DIABETES MELLITUS WITHOUT COMPLICATION, WITHOUT LONG-TERM CURRENT USE OF INSULIN (H): ICD-10-CM

## 2021-06-08 NOTE — PROGRESS NOTES
Chief Complaint   Patient presents with     Cough     nasal congestion x 1 week, was getting better , but symptoms returned       HPI:    Patient is here for having a productive cough for a while now, worsened a week ago. [facility] with [facility] and a cough medicine without relief. She took NyQuil to help with sleep. She also reported nasal congestion. No fever, chills, chest pain, shortness of breath. No hx of asthma. She has diabetes, and hx of stroke.     ROS: Pertinent ROS noted in HPI.     Allergies   Allergen Reactions     Penicillins        There is no problem list on file for this patient.    No family history on file.  Social History     Social History     Marital status:      Spouse name: N/A     Number of children: N/A     Years of education: N/A     Occupational History     Not on file.     Social History Main Topics     Smoking status: Never Smoker     Smokeless tobacco: Not on file     Alcohol use Not on file     Drug use: Not on file     Sexual activity: Not on file     Other Topics Concern     Not on file     Social History Narrative           Objective:    Vitals:    02/13/17 1756   BP: 112/84   Pulse: 90   Resp: 20   Temp: 98.8  F (37.1  C)   SpO2: 97%       Gen:NAD  Throat: normal  Ears: normal TMs and ear canals  Nose: clear rhinorrhea  Neck: no adenopathy  CV: RRR, no M, R, G  Pulm: CTAB, normal effort      Bronchitis  -     doxycycline (VIBRA-TABS) 100 MG tablet; Take 1 tablet (100 mg total) by mouth 2 (two) times a day for 10 days.      Supportive cares as discussed and directed, follow up as directed.

## 2021-06-10 ENCOUNTER — COMMUNICATION - HEALTHEAST (OUTPATIENT)
Dept: FAMILY MEDICINE | Facility: CLINIC | Age: 61
End: 2021-06-10

## 2021-06-10 DIAGNOSIS — E11.9 DM (DIABETES MELLITUS) (H): ICD-10-CM

## 2021-06-11 NOTE — TELEPHONE ENCOUNTER
RN cannot approve Refill Request    RN can NOT refill this medication Protocol failed and NO refill given. Last office visit: 9/23/2019 Arline Barnes CNP Last Physical: Visit date not found Last MTM visit: Visit date not found Last visit same specialty: 9/23/2019 Arline Barnes CNP.  Next visit within 3 mo: Visit date not found  Next physical within 3 mo: Visit date not found      Trudi Noriega, [facility] Triage/Med Refill 9/10/2020    Requested Prescriptions   Pending Prescriptions Disp Refills     lisinopriL (PRINIVIL,ZESTRIL) 2.5 MG tablet [Pharmacy Med Name: Lisinopril Oral Tablet 2.5 MG] 90 tablet 0     Sig: TAKE 1 TABLET BY MOUTH DAILY       Ace Inhibitors Refill Protocol Failed - 9/9/2020  2:03 AM        Failed - Serum Potassium in past 12 months     No results found for: LN-POTASSIUM          Failed - Serum Creatinine in past 12 months     Creatinine   Date Value Ref Range Status   06/13/2019 0.85 0.60 - 1.10 mg/dL Final             Passed - PCP or prescribing provider visit in past 12 months       Last office visit with prescriber/PCP: 9/23/2019 Arline Barnes CNP OR same dept: 9/23/2019 Arline Barnes CNP OR same specialty: 9/23/2019 Arline Barnes CNP  Last physical: Visit date not found Last MTM visit: Visit date not found   Next visit within 3 mo: Visit date not found  Next physical within 3 mo: Visit date not found  Prescriber OR PCP: Arline Barnes CNP  Last diagnosis associated with med order: 1. DM (diabetes mellitus) (H)  - lisinopriL (PRINIVIL,ZESTRIL) 2.5 MG tablet [Pharmacy Med Name: Lisinopril Oral Tablet 2.5 MG]; TAKE 1 TABLET BY MOUTH DAILY  Dispense: 90 tablet; Refill: 0    If protocol passes may refill for 12 months if within 3 months of last provider visit (or a total of 15 months).             Passed - Blood pressure filed in past 12 months     BP Readings from Last 1 Encounters:   09/23/19 132/90

## 2021-06-11 NOTE — PROGRESS NOTES
Patient ID: Maranda Thrasher is a 56 y.o. female.  /90  Pulse 80  Wt 171 lb (77.6 kg)  Breastfeeding? No  BMI 30.29 kg/m2    Assessment/Plan:                   Diagnoses and all orders for this visit:    Hyperlipidemia  -     Comprehensive Metabolic Panel  -     Lipid [place] FASTING    Type 2 diabetes mellitus  -     Glycosylated Hemoglobin A1c  -     Comprehensive Metabolic Panel    DM (diabetes mellitus)  -     lisinopril (PRINIVIL,ZESTRIL) 2.5 MG tablet; Take 1 tablet (2.5 mg total) by mouth daily.  Dispense: 90 tablet; Refill: 3    Seizure disorder  -     levETIRAcetam (KEPPRA) 500 MG tablet; Take 1 tablet (500 mg total) by mouth 2 (two) times a day.  Dispense: 180 tablet; Refill: 3  -     Levetiracetam [Keppra ]    Vasculitis    CVA (cerebral vascular accident)  -     Lipid [place] FASTING    Other orders  -     metFORMIN (GLUCOPHAGE) 500 MG tablet; Take 1 tablet (500 mg total) by mouth 2 (two) times a day with meals.  Dispense: 180 tablet; Refill: 3      DISCUSSION  Medications refilled.  Obtain labs as above.  Adjust if indicated.  See further discussion below.  Subjective:     HPI    Maranda Thrasher is a 56-year-old female whose medical history includes type 2 diabetes, vasculitis leading to stroke, seizure disorder, hyperlipidemia.  She is very adamant that she wants to take a very conservative approach to her health care.  Today as before we discussed screening tests and immunizations and she declines.  We reviewed her diabetes and she states it is very difficult to take the extended release metformin.  We discussed that we could change to regular release to take twice daily and she would have more freedom in terms of being able to divide pills.  She is not checking blood sugars but A1c readings are generally favorable as they are today.  We discussed the importance of routine eye care.  She denies any symptoms of neuropathy.  No chest pain or shortness of breath.  Overall she reports she is doing  well.    Review of Systems  Complete review of systems is obtained.  Other than the specific considerations noted above complete review of systems is negative.     Objective:   Medications:  Current Outpatient Prescriptions   Medication Sig Note     aspirin 81 MG EC tablet Take 81 mg by mouth daily.      b complex vitamins tablet Take 1 tablet by mouth daily.      latanoprost (XALATAN) 0.005 % ophthalmic solution  6/9/2016: Received from: External Pharmacy     levETIRAcetam (KEPPRA) 500 MG tablet Take 1 tablet (500 mg total) by mouth 2 (two) times a day.      lisinopril (PRINIVIL,ZESTRIL) 2.5 MG tablet Take 1 tablet (2.5 mg total) by mouth daily.      VENTOLIN HFA 90 mcg/actuation inhaler  2/13/2017: Received from: External Pharmacy     metFORMIN (GLUCOPHAGE) 500 MG tablet Take 1 tablet (500 mg total) by mouth 2 (two) times a day with meals.      Allergies:  Allergies   Allergen Reactions     Penicillins      Tobacco:   reports that she has never smoked. She does not have any smokeless tobacco history on file.     Physical Exam      /90  Pulse 80  Wt 171 lb (77.6 kg)  Breastfeeding? No  BMI 30.29 kg/m2        General Appearance:    Alert, cooperative, no distress   Eyes:    No conjunctival irritation, no scleral icterus       Lungs:     Clear to auscultation bilaterally, respirations unlabored   Heart:    Regular rate and rhythm, S1 and S2 normal, no murmur, rub   or gallop   Extremities:   Extremities normal, atraumatic, no cyanosis or edema   Skin:   Skin color, texture, turgor normal, no rashes or lesions   Neurologic:   Normal strength and sensation

## 2021-06-13 RX ORDER — LISINOPRIL 2.5 MG/1
TABLET ORAL
Qty: 90 TABLET | Refills: 2 | Status: SHIPPED | OUTPATIENT
Start: 2021-06-13 | End: 2022-01-03

## 2021-06-13 NOTE — TELEPHONE ENCOUNTER
RN cannot approve Refill Request    RN can NOT refill this medication Protocol failed and NO refill given. Last office visit: 4/12/2019 Nir Barton MD Last Physical: 3/11/2019 Last MTM visit: Visit date not found Last visit same specialty: 9/23/2019 Arline Barnes CNP.  Next visit within 3 mo: Visit date not found  Next physical within 3 mo: Visit date not found      Trudi Noriega, [facility] Connection Triage/Med Refill 12/15/2020    Requested Prescriptions   Pending Prescriptions Disp Refills     lisinopriL (PRINIVIL,ZESTRIL) 2.5 MG tablet [Pharmacy Med Name: Lisinopril Oral Tablet 2.5 MG] 90 tablet 0     Sig: TAKE 1 TABLET BY MOUTH DAILY       Ace Inhibitors Refill Protocol Failed - 12/14/2020  7:10 PM        Failed - PCP or prescribing provider visit in past 12 months       Last office visit with prescriber/PCP: 4/12/2019 Nir Barton MD OR same dept: Visit date not found OR same specialty: 9/23/2019 Arline Barnes CNP  Last physical: 3/11/2019 Last MTM visit: Visit date not found   Next visit within 3 mo: Visit date not found  Next physical within 3 mo: Visit date not found  Prescriber OR PCP: Nir Barton MD  Last diagnosis associated with med order: 1. DM (diabetes mellitus) (H)  - lisinopriL (PRINIVIL,ZESTRIL) 2.5 MG tablet [Pharmacy Med Name: Lisinopril Oral Tablet 2.5 MG]; TAKE 1 TABLET BY MOUTH DAILY  Dispense: 90 tablet; Refill: 0    If protocol passes may refill for 12 months if within 3 months of last provider visit (or a total of 15 months).             Failed - Serum Potassium in past 12 months     No results found for: LN-POTASSIUM          Failed - Blood pressure filed in past 12 months     BP Readings from Last 1 Encounters:   09/23/19 132/90             Failed - Serum Creatinine in past 12 months     Creatinine   Date Value Ref Range Status   06/13/2019 0.85 0.60 - 1.10 mg/dL Final

## 2021-06-13 NOTE — TELEPHONE ENCOUNTER
Fax received from pharmacy  Please refill if appropriate     Results for orders placed or performed in visit on 06/13/19   Comprehensive Metabolic Panel   Result Value Ref Range    Sodium 140 136 - 145 mmol/L    Potassium 4.6 3.5 - 5.0 mmol/L    Chloride 101 98 - 107 mmol/L    CO2 28 22 - 31 mmol/L    Anion Gap, Calculation 11 5 - 18 mmol/L    Glucose 212 (H) 70 - 125 mg/dL    BUN 12 8 - 22 mg/dL    Creatinine 0.85 0.60 - 1.10 mg/dL    GFR MDRD Af Amer >60 >60 mL/min/1.73m2    GFR MDRD Non Af Amer >60 >60 mL/min/1.73m2    Bilirubin, Total 0.9 0.0 - 1.0 mg/dL    Calcium 10.6 (H) 8.5 - 10.5 mg/dL    Protein, Total 7.1 6.0 - 8.0 g/dL    Albumin 4.1 3.5 - 5.0 g/dL    Alkaline Phosphatase 52 45 - 120 U/L    AST 23 0 - 40 U/L    ALT 41 0 - 45 U/L

## 2021-06-13 NOTE — PROGRESS NOTES
Chief Complaint   Patient presents with     Face Swelling     Redness, face is swelling, warm to touch, hurts on L side of face and tears/drainage on L eye. noticed it 2 days ago when she woke up. started small and has increased in size       HPI    Patient is here for 2 days of pain with redness and swelling of face, without any recent unusual contacts nor exposures. No fever, cough, itching of face, sore throat. No hx of rosacea.       ROS: Pertinent ROS noted in HPI.     Allergies   Allergen Reactions     Penicillins        Patient Active Problem List   Diagnosis     Diabetes mellitus, type 2     CVA (cerebral vascular accident)     Vasculitis     Hyperlipidemia     Seizure disorder       No family history on file.    Social History     Social History     Marital status:      Spouse name: N/A     Number of children: N/A     Years of education: N/A     Occupational History     Not on file.     Social History Main Topics     Smoking status: Never Smoker     Smokeless tobacco: Not on file     Alcohol use Not on file     Drug use: Not on file     Sexual activity: Not on file     Other Topics Concern     Not on file     Social History Narrative         Objective:    Vitals:    11/07/17 1012   BP: 140/82   Pulse: 97   Resp: 18   Temp: 98  F (36.7  C)   SpO2: 93%       Gen:NAD  Oropharynx: normal throat, oral mucosa  Ears: normal Tms and canals  Nose:no discharge  Eyes: normal conjunctiva, corneas grossly clear.  CV:RRR, no M, R, G  Pulm: CTAB  Skin: moderate erythema, with swelling and tenderness to palpation at bilateral face L>R, involving the nasal bridge. The affected areas are somewhat demarcated. No pustules, papules, vesicles.         Erysipelas  -     clindamycin (CLEOCIN) 300 MG capsule; Take 1 capsule (300 mg total) by mouth 3 (three) times a day for 10 days.      Discussed differentials. Close follow up as directed.

## 2021-06-14 ENCOUNTER — AMBULATORY - HEALTHEAST (OUTPATIENT)
Dept: LAB | Facility: CLINIC | Age: 61
End: 2021-06-14

## 2021-06-14 DIAGNOSIS — E11.9 TYPE 2 DIABETES MELLITUS WITHOUT COMPLICATION, WITHOUT LONG-TERM CURRENT USE OF INSULIN (H): ICD-10-CM

## 2021-06-14 LAB
ANION GAP SERPL CALCULATED.3IONS-SCNC: 14 MMOL/L (ref 5–18)
BUN SERPL-MCNC: 15 MG/DL (ref 8–22)
CALCIUM SERPL-MCNC: 10 MG/DL (ref 8.5–10.5)
CHLORIDE BLD-SCNC: 101 MMOL/L (ref 98–107)
CO2 SERPL-SCNC: 24 MMOL/L (ref 22–31)
CREAT SERPL-MCNC: 0.87 MG/DL (ref 0.6–1.1)
GFR SERPL CREATININE-BSD FRML MDRD: >60 ML/MIN/1.73M2
GLUCOSE BLD-MCNC: 201 MG/DL (ref 70–125)
HBA1C MFR BLD: 9 %
POTASSIUM BLD-SCNC: 5.1 MMOL/L (ref 3.5–5)
SODIUM SERPL-SCNC: 139 MMOL/L (ref 136–145)

## 2021-06-14 NOTE — TELEPHONE ENCOUNTER
They are open to trying the metformin ER prescription again and will call us back with an update.    No other concerns, doing well otherwise.

## 2021-06-14 NOTE — TELEPHONE ENCOUNTER
Please call patient directly.  Inform her that I have become aware from the pharmacist that she is having diarrhea, this is likely from the Metformin.  We can change the Metformin to an extended release formulation which will likely help.  If she is okay with me making this change I will simply send a new prescription to the pharmacy which will hopefully help reduce diarrhea.  If there are other questions or concerns please let me know

## 2021-06-14 NOTE — TELEPHONE ENCOUNTER
Reason contacted:  Medication update  Information relayed:    Patients  Anton called back and was notified of the below message   Additional questions:  No  Further follow-up needed:  No  Okay to leave a detailed message:  No

## 2021-06-14 NOTE — PROGRESS NOTES
"Speech Language/Pathology  Videofluoroscopic Swallow Study       Problem:  Patient Active Problem List   Diagnosis     Diabetes mellitus, type 2     CVA (cerebral vascular accident)     Vasculitis     Hyperlipidemia     Seizure disorder       Onset date: 12/15/2017 (order date)  Reason for evaluation: Concern for dysphagia  Pertinent History: Strokes related to vasculitis; seizure disorder  Current Diet: Regular textures and thin liquids  Baseline Diet: Regular textures and thin liquids    Patient is a 57 year old female referred due to concerns of dysphagia. Patient reports that foods and large pills seem to \"get stuck\" in her throat several times a week. She points to both her larynx and sternum to indicate where this sensation occurs. Drinking liquid does not seem to help the food or pills pass through. Patient does report having some anxiety about swallowing pills. She recently discontinued her Metformin, and reports that her swallowing issues have improved since then. Patient denies any history of acid reflux disease, however, she takes TUMS every night before bed. If she doesn't, it seems she experiences some regurgitation of stomach contents. Patient was vague in her description of this, however. Of note, she admits to being a \"difficult patient\" and, per her 's report, has a history of noncompliance with medical recommendations. The purpose of this study is to evaluate the oropharyngeal phase of patient's swallow and determine her aspiration risk.    Patient presents as alert and cooperative during this evaluation. She presents with moderate expressive aphasia and possible mild receptive aphasia. Patient was accompanied to study by her , Anton, who helped provide history due to her communication impairment.    An  was not applicable    Patient was given honey-thick, puree, nectar-thick, and thin consistencies of barium, as well as a solid (barium-coated samson cracker) and a 13 mm " barium tablet with water.    Oral Phase:    Dentition/Oral hygiene: Adequate    Bolus prep and oral control were mildly impaired. Mastication of the solid was mildly-moderately decreased and the patient used rotary chewing.     Anterior-Posterior transit was mildly impaired. Patient had difficulty moving the tablet posteriorly despite multiple attempts and several large sips of water. Eventually, however, she was able to swallow it.    Premature spillage occurred with all consistencies.    Tongue base retraction was not impaired.    Oral stasis did not occur with any consistency.    Pharyngeal Phase:    Aspiration did not occur with any consistency during this study.    Laryngeal penetration did not occur with any consistency during this study.    Swallow response was delayed with all consistencies except the tablet. This resulted in pourover past the tip of the epiglottis with honey and nectar-thick liquid, and into the pyriform sinuses with thin liquid and the solid. With puree consistency, the swallow response did not occur until the vallecular space was filled to near-capacity.    Epiglottic movement was complete consistently across texture trials.    Pharyngeal stasis did not occur with any consistency.    Pharyngeal constriction was not impaired.    Hyolaryngeal elevation was mildly reduced. Hyolaryngeal excursion was mildly reduced.    Cricopharyngeal function was not impaired. Cervical esophageal function was not impaired.    Assessment:    Patient demonstrated mild oral dysphagia and mild pharyngeal dysphagia. Overall, however, patient's swallow function is grossly WNL for an individual with history of multiple CVAs and seizure disorder.    Patient is at low aspiration risk with all intake.    Rehab potential is good based on prior level of function and evaluation results.    Recommendations:    Plan: Continue current diet of Regular textures and thin liquids as tolerated    Strategies: Patient to follow  standard safe swallowing guidelines, including sitting fully upright for all intake, eating slowly, and taking one small bite or sip at a time. Patient to also alternate a bite of food with a sip of liquid. Due to oral dysphagia when attempting to swallow a tablet with water, patient may wish to start taking her pills mixed in puree (e.g., applesauce, pudding, yogurt).    Speech Therapy is not recommended at this time    Referrals: Consider esophagram and/or GI consult. Patient's symptoms are suggestive of a possible esophageal dysmotility issue. Suspect poorly-controlled GERD.    Reviewed history of swallow problem with patient and , and verbally explained roles of SLP and radiologist. Verbally explained process of VFSS prior to administration of barium. Verbally explained results and recommendations to patient and . SLP answered questions and stated that a written copy of this report will be faxed to her primary care provider. Patient and  verbalized understanding.  demonstrated excellent comprehension of the information provided. Patient appeared to have partial understanding. Suspect some receptive aphasia and/or cognitive impairment.    45 dysphagia minutes    Farida Waldrop MA, CCC-SLP

## 2021-06-14 NOTE — PROGRESS NOTES
Patient ID: Maranda Thrasher is a 57 y.o. female.  /66  Pulse 71  Wt 161 lb (73 kg)  SpO2 100%  BMI 28.52 kg/m2    Assessment/Plan:                   Diagnoses and all orders for this visit:    Type 2 diabetes mellitus  -     Glycosylated Hemoglobin A1c  -     Basic Metabolic Panel    CVA (cerebral vascular accident)  -     XR Swallow Study W Speech; Future; Expected date: 12/15/17    Vasculitis    Seizure disorder    Hyperlipidemia    Dysphagia  -     XR Swallow Study W Speech; Future; Expected date: 12/15/17  -     HM2(CBC w/o Differential)      DISCUSSION  We will stop metformin for the time being.  Blood sugars have improved tremendously.  Pill is difficult to swallow and she is having some dysphasia.  Also has had recent weight loss.  Will monitor closely and at a minimum plan for an A1c in 3 months to reevaluate.  Discussed the importance of following a low sugar low carbohydrate diet.  For the dysphasia send her for a swallow study.  May also need endoscopy.  No other medication changes.  Obtain labs as above.  Declines routine health preventive measures.  DATA:      A1C  Hemoglobin A1c (%)   Date Value   12/15/2017 6.1 (H)   06/13/2017 6.5 (H)   09/14/2016 6.7 (H)      Cholesterol:   Y  LDL Calculated (mg/dL)   Date Value   06/13/2017 156 (H)   06/14/2016 142 (H)   06/01/2015 155 (H)      Blood Pressure:   BP Readings from Last 3 Encounters:   12/15/17 124/66   11/07/17 140/82   06/13/17 138/90     Urine Microalbumin  No results found for: MICROALBUR, [id number]      Wt Readings from Last 3 Encounters:   12/15/17 161 lb (73 kg)   11/07/17 165 lb (74.8 kg)   06/13/17 171 lb (77.6 kg)     Body mass index is 28.52 kg/(m^2).    Subjective:     HPI    Maranda Thrasher is a 57 y.o. female who is here today with her .  She is complex medical history including prior history of strokes from vasculitis.  Also has a history of seizure disorder.  Recently saw a neurologist.  Overall doing well in that  regard.  Does report she has had dysphasia ongoing for about a year which has worsened.  Modifying diet has helped to improve.  Is overall eating less and weight is down.  Blood sugars have been lower A1c is lower.  Feels that metformin is also difficult to swallow.  Would like to be off of metformin.  Spent time today discussing the importance of working up her dysphasia as it is not clear from the history exactly what would be the cause.  There is reported family history of dysphasia caused by acid reflux in her father.  Patient does have occasional acid reflux symptoms.  She does not have any hematemesis hematochezia or melena.  Has not had a colonoscopy.  Does not check blood sugars on a regular basis.  Does not have specific complications associated with diabetes.  Eye exam report from 2017 reviewed.  Does not have any problems with her feet.  Denies chest pain and shortness of breath.    Review of Systems  Complete review of systems is obtained.  Other than the specific considerations noted above complete review of systems is negative.    Objective:   Medications:  Current Outpatient Prescriptions   Medication Sig Note     aspirin 81 MG EC tablet Take 81 mg by mouth daily.      b complex vitamins tablet Take 1 tablet by mouth daily.      latanoprost (XALATAN) 0.005 % ophthalmic solution  6/9/2016: Received from: External Pharmacy     levETIRAcetam (KEPPRA) 500 MG tablet Take 1 tablet (500 mg total) by mouth 2 (two) times a day.      lisinopril (PRINIVIL,ZESTRIL) 2.5 MG tablet TAKE ONE TABLET BY MOUTH EVERY DAY      metFORMIN (GLUCOPHAGE) 500 MG tablet Take 1 tablet (500 mg total) by mouth 2 (two) times a day with meals.      VENTOLIN HFA 90 mcg/actuation inhaler  2/13/2017: Received from: External Pharmacy     Allergies:  Allergies   Allergen Reactions     Penicillins      Tobacco:   reports that she has never smoked. She does not have any smokeless tobacco history on file.     Physical Exam      /66   Pulse 71  Wt 161 lb (73 kg)  SpO2 100%  BMI 28.52 kg/m2    Recent Results (from the past 240 hour(s))   Glycosylated Hemoglobin A1c   Result Value Ref Range    Hemoglobin A1c 6.1 (H) 3.5 - 6.0 %       General Appearance:    Alert, cooperative, no distress   Eyes:    No conjunctival irritation, no scleral icterus       Throat:   Lips, mucosa, and tongue normal; teeth and gums normal   Neck:   Supple, symmetrical, trachea midline, no adenopathy;        thyroid:  No enlargement/tenderness/nodules   Lungs:     Clear to auscultation bilaterally, respirations unlabored   Heart:    Regular rate and rhythm,  no murmur, rub   or gallop   Abdomen:     Soft, non-tender, bowel sounds active,     no masses, no organomegaly   Extremities:   Extremities normal, atraumatic, no cyanosis or edema   Skin:   Skin color, texture, turgor normal, no rashes or lesions   Neurologic:   Normal strength and sensation

## 2021-06-14 NOTE — TELEPHONE ENCOUNTER
Patient told pharmacist she is having significant diarrhea.    Pharmacist is suggesting to try metformin ER tablets 500mg two times a day.    To Dr Barton for review.

## 2021-06-14 NOTE — TELEPHONE ENCOUNTER
Left message to call back for: medication update  Information to relay to patient:  Below message

## 2021-06-15 PROBLEM — I63.9 CVA (CEREBRAL VASCULAR ACCIDENT) (H): Status: ACTIVE | Noted: 2017-05-29

## 2021-06-15 PROBLEM — I77.6 VASCULITIS (H): Status: ACTIVE | Noted: 2017-05-29

## 2021-06-15 PROBLEM — E11.9 DIABETES MELLITUS, TYPE 2 (H): Status: ACTIVE | Noted: 2017-05-29

## 2021-06-15 PROBLEM — G40.909 SEIZURE DISORDER (H): Status: ACTIVE | Noted: 2017-05-29

## 2021-06-15 PROBLEM — E78.5 HYPERLIPIDEMIA: Status: ACTIVE | Noted: 2017-05-29

## 2021-06-15 NOTE — PROGRESS NOTES
Patient ID: Maranda Thrasher is a 60 y.o. female.  /76   Pulse 87   Wt 181 lb (82.1 kg)   SpO2 98%   BMI 32.06 kg/m      Assessment/Plan:                   Diagnoses and all orders for this visit:    Type 2 diabetes mellitus without complication, without long-term current use of insulin (H)  -     Glycosylated Hemoglobin A1c  -     Comprehensive Metabolic Panel  -     pioglitazone (ACTOS) 15 MG tablet; Take 1 tablet (15 mg total) by mouth daily.  Dispense: 30 tablet; Refill: 11  -     glipiZIDE (GLUCOTROL XL) 5 MG 24 hr tablet; Take 1 tablet (5 mg total) by mouth daily with breakfast.  Dispense: 90 tablet; Refill: 3    Seizure disorder (H)  -     Levetiracetam [Keppra ]  -     Creatinine    History of CVA (cerebrovascular accident)  -     HM2(CBC w/o Differential)    Other orders  -     Cancel: glipiZIDE (GLUCOTROL XL) 2.5 MG 24 hr tablet; Take 1 tablet (2.5 mg total) by mouth daily.  Dispense: 90 tablet; Refill: 3  -     Cancel: glipiZIDE (GLUCOTROL XL) 2.5 MG 24 hr tablet; Take 1 tablet (2.5 mg total) by mouth daily.  Dispense: 90 tablet; Refill: 3         DISCUSSION  Increase glipizide to 5 mg extended release once daily.  Add pioglitazone 15 mg daily.  Recommend monitoring blood sugar.  Further consider injectable forms of medication in the future.  Check additional labs as noted.  Continue with aspirin, Keppra, lisinopril.  Subjective:     HPI    Maranda Thrasher is a 60 y.o. female is here today with her  primarily in follow-up on diabetes.  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  She subsequently developed a seizure disorder.  She follows with neurology her most recent telephone consultation note is reviewed.  It is recommended that she continue seizure prophylaxis with Keppra and take a baby aspirin for stroke risk reduction.    In general she has expressed a strong desire to keep medication regimens as simple as possible and has declined  considerations for additional medication including statin therapy.    With her type 2 diabetes she has been on Metformin and glipizide.  She has had variable control.  I have not seen her for about 1 year.  There were concerns recently about Metformin causing diarrhea.    We tried to change to extended release however this also caused diarrhea and they stopped.  I was not informed that they decided to stop the extended release and so she has only been taking Metformin for management of her blood sugar specifically.    Unfortunately do not check blood sugars.  She denies outright symptoms of hyperglycemia.  No symptoms of low blood sugars.  Remains only on 2.5 mg extended release glipizide.  A1c today is 10.9.  Discussed the importance of getting better control.  Discussed options for medication treatment.  Patient would be willing to consider injection medication but once a week be ideal, on further discussion there insurance coverage is minimal and they have a very high deductible they have to pay including medications and cannot afford an expensive medication for diabetes management at this time.  We discussed options for increasing glipizide and adding other oral medication therapy which should be more reasonably priced, specifically discussed pioglitazone.  She denies other problems or concerns.  She denies pain denies problems with her feet including numbness or tingling.    She is continuing to take baby aspirin.  She denies shortness of breath or chest pain.  Blood pressure is well controlled.  Was diagnosed with granuloma annulare on the arms by dermatology last year.        Review of Systems  Complete review of systems is obtained.  Other than the specific considerations noted above complete review of systems is negative.          Objective:   Medications:  Current Outpatient Medications   Medication Sig Note     aspirin 81 MG EC tablet Take 81 mg by mouth daily.      betamethasone, augmented, (DIPROLENE)  0.05 % ointment ABILIO EXT AA BID      blood glucose test (ACCU-CHEK CIARA PLUS TEST STRP) strips Used to check blood sugar once daily for management of type 2 diabetes.  Dispense brand per patient's insurance at pharmacy discretion.      blood-glucose meter Misc Used to check blood sugar once daily for management of type 2 diabetes      generic lancets (FINGERSTIX LANCETS) Used to check blood sugar once daily for management of type 2 diabetes. dispense brand per patient's insurance at pharmacy discretion.      glipiZIDE (GLUCOTROL XL) 2.5 MG 24 hr tablet Take 1 tablet (2.5 mg total) by mouth daily.      latanoprost (XALATAN) 0.005 % ophthalmic solution  6/9/2016: Received from: External Pharmacy     levETIRAcetam (KEPPRA) 500 MG tablet Take 1 tablet (500 mg total) by mouth 2 (two) times a day.      lisinopriL (PRINIVIL,ZESTRIL) 2.5 MG tablet TAKE ONE TABLET BY MOUTH ONE TIME DAILY       triamcinolone (KENALOG) 0.1 % cream ABILIO EXT AA BID      glipiZIDE (GLUCOTROL XL) 5 MG 24 hr tablet Take 1 tablet (5 mg total) by mouth daily with breakfast.      pioglitazone (ACTOS) 15 MG tablet Take 1 tablet (15 mg total) by mouth daily.      Allergies:  Allergies   Allergen Reactions     Penicillins      Tobacco:   reports that she has never smoked. She has never used smokeless tobacco.     Physical Exam      /76   Pulse 87   Wt 181 lb (82.1 kg)   SpO2 98%   BMI 32.06 kg/m      General Appearance:    Alert, cooperative, no distress   Eyes:   No scleral icterus or conjunctival irritation       Lungs:     Clear to auscultation bilaterally, respirations unlabored, no wheezes or crackles   Heart:    Regular rate and rhythm,  No murmur   Extremities:  No edema, no joint swelling or redness, no evidence of any injuries   Skin:  No concerning skin findings, no suspicious moles, no rashes   Neurologic:  On gross examination there is no motor or sensory deficit.  Patient walks with a normal gait

## 2021-06-15 NOTE — TELEPHONE ENCOUNTER
RN cannot approve Refill Request    RN can NOT refill this medication PCP messaged that patient is overdue for Labs and Office Visit. Last office visit: 4/12/2019 Nir Barton MD Last Physical: 3/11/2019 Last MTM visit: Visit date not found Last visit same specialty: 9/23/2019 Arline Barnes CNP.  Next visit within 3 mo: Visit date not found  Next physical within 3 mo: Visit date not found      Naomy Gatica, [facility] Triage/Med Refill 3/11/2021    Requested Prescriptions   Pending Prescriptions Disp Refills     lisinopriL (PRINIVIL,ZESTRIL) 2.5 MG tablet [Pharmacy Med Name: Lisinopril Oral Tablet 2.5 MG] 90 tablet 0     Sig: TAKE ONE TABLET BY MOUTH ONE TIME DAILY       Ace Inhibitors Refill Protocol Failed - 3/11/2021  2:01 AM        Failed - PCP or prescribing provider visit in past 12 months       Last office visit with prescriber/PCP: 4/12/2019 Nir Barton MD OR same dept: Visit date not found OR same specialty: 9/23/2019 Arline Barnes CNP  Last physical: 3/11/2019 Last MTM visit: Visit date not found   Next visit within 3 mo: Visit date not found  Next physical within 3 mo: Visit date not found  Prescriber OR PCP: Nir Barton MD  Last diagnosis associated with med order: 1. DM (diabetes mellitus) (H)  - lisinopriL (PRINIVIL,ZESTRIL) 2.5 MG tablet [Pharmacy Med Name: Lisinopril Oral Tablet 2.5 MG]; TAKE ONE TABLET BY MOUTH ONE TIME DAILY   Dispense: 90 tablet; Refill: 0    If protocol passes may refill for 12 months if within 3 months of last provider visit (or a total of 15 months).             Failed - Serum Potassium in past 12 months     No results found for: LN-POTASSIUM          Failed - Blood pressure filed in past 12 months     BP Readings from Last 1 Encounters:   09/23/19 132/90             Failed - Serum Creatinine in past 12 months     Creatinine   Date Value Ref Range Status   06/13/2019 0.85 0.60 - 1.10 mg/dL Final

## 2021-06-16 NOTE — TELEPHONE ENCOUNTER
Reason contacted:  Results   Information relayed:  Below message relayed to patients marguerite Walton   Additional questions:  No  Further follow-up needed:  No  Okay to leave a detailed message:  No

## 2021-06-16 NOTE — TELEPHONE ENCOUNTER
----- Message from Nir Barton MD sent at 3/14/2021  4:30 PM CDT -----  Please call patient: Blood sugar was very high, it was greater than 400.  She should proceed with the new plan as we discussed and try to check some blood sugars.  No other significant problem was identified.  Please send a copy of lab test results to home address.

## 2021-06-17 ENCOUNTER — COMMUNICATION - HEALTHEAST (OUTPATIENT)
Dept: FAMILY MEDICINE | Facility: CLINIC | Age: 61
End: 2021-06-17

## 2021-06-19 NOTE — LETTER
Letter by Nir Barton MD at      Author: Nir Barton MD Service: -- Author Type: --    Filed:  Encounter Date: 6/16/2019 Status: (Other)         Maranda Thrasher  [address] [place] [id number]             June 16, 2019         Dear Ms. Thrasher,    Below are the results from your recent visit:    Resulted Orders   Comprehensive Metabolic Panel   Result Value Ref Range    Sodium 140 136 - 145 mmol/L    Potassium 4.6 3.5 - 5.0 mmol/L    Chloride 101 98 - 107 mmol/L    CO2 28 22 - 31 mmol/L    Anion Gap, Calculation 11 5 - 18 mmol/L    Glucose 212 (H) 70 - 125 mg/dL    BUN 12 8 - 22 mg/dL    Creatinine 0.85 0.60 - 1.10 mg/dL    GFR MDRD Af Amer >60 >60 mL/min/1.73m2    GFR MDRD Non Af Amer >60 >60 mL/min/1.73m2    Bilirubin, Total 0.9 0.0 - 1.0 mg/dL    Calcium 10.6 (H) 8.5 - 10.5 mg/dL    Protein, Total 7.1 6.0 - 8.0 g/dL    Albumin 4.1 3.5 - 5.0 g/dL    Alkaline Phosphatase 52 45 - 120 U/L    AST 23 0 - 40 U/L    ALT 41 0 - 45 U/L    Narrative    Fasting Glucose reference range is 70-99 mg/dL per  American Diabetes Association (ADA) guidelines.   Lipid [place] FASTING   Result Value Ref Range    Cholesterol 214 (H) <=199 mg/dL    Triglycerides 238 (H) <=149 mg/dL    HDL Cholesterol 46 (L) >=50 mg/dL    LDL Calculated 120 <=129 mg/dL    Patient Fasting > 8hrs? No    Glycosylated Hemoglobin A1c   Result Value Ref Range    Hemoglobin A1c 7.9 (H) 3.5 - 6.0 %     The A1c is much improved and other labs are stable.  Continue current medications.  Schedule a follow-up visit with me in 3 months.    Please call with questions or contact us using [employer].    Sincerely,        Electronically signed by Nir Barton MD

## 2021-06-19 NOTE — LETTER
Letter by Nir Barton MD at      Author: Nir Barton MD Service: -- Author Type: --    Filed:  Encounter Date: 8/29/2019 Status: (Other)         Maranda Thrasher  [address] [place] [id number]      August 29, 2019      Dear Maranda    In reviewing your records, we have determined a gap in your preventive services. Based on your age and health history, we recommend the follow service.     ? Due for Diabetic check - (after 9/14/19)  ? Lab work  ? Med check      If you have had the service elsewhere, please contact us so we can update our records. Please let us know if you have transferred your care to another clinic.    Please call [phone] to schedule this appointment.    We believe that a strong preventive care program, including regular physicals and follow-up care is an important part of a healthy lifestyle and we are committed to helping you maintain your health.    Thank you for choosing us as your health care provider.    Sincerely,     [facility]

## 2021-06-19 NOTE — LETTER
Letter by Nir Barton MD at      Author: Nir Barton MD Service: -- Author Type: --    Filed:  Encounter Date: 9/5/2019 Status: (Other)         Maranda Thrasher  [address] [place] [phone]      September 5, 2019      Dear Maranda    In reviewing your records, we have determined a gap in your preventive services. Based on your age and health history, we recommend the follow service.     ? Colon cancer screening    If you have had the service elsewhere, please contact us so we can update our records. Please let us know if you have transferred your care to another clinic.    Please call [phone] to schedule this appointment.    We believe that a strong preventive care program, including regular physicals and follow-up care is an important part of a healthy lifestyle and we are committed to helping you maintain your health.    Thank you for choosing us as your health care provider.    Sincerely,     [facility]

## 2021-06-21 NOTE — LETTER
Letter by Nir Barton MD at      Author: Nir Barton MD Service: -- Author Type: --    Filed:  Encounter Date: 3/15/2021 Status: (Other)         Maranda Thrasher  [address] [place] [id number]             March 15, 2021         Dear Ms. Thrasher,    Below are the results from your recent visit:    Resulted Orders   Glycosylated Hemoglobin A1c   Result Value Ref Range    Hemoglobin A1c 10.9 (H) <=5.6 %   Levetiracetam [Keppra ]   Result Value Ref Range    Levetiracetam 18.5 6.0 - 46.0 ug/mL    Narrative    Suggested Trough Concentrations: 6.0 - 46.0 ug/mL   Creatinine   Result Value Ref Range    Creatinine 0.95 0.60 - 1.10 mg/dL    GFR MDRD Af Amer >60 >60 mL/min/1.73m2    GFR MDRD Non Af Amer 60 (L) >60 mL/min/1.73m2   HM2(CBC w/o Differential)   Result Value Ref Range    WBC 7.2 4.0 - 11.0 thou/uL    RBC 4.80 3.80 - 5.40 mill/uL    Hemoglobin 14.6 12.0 - 16.0 g/dL    Hematocrit 43.6 35.0 - 47.0 %    MCV 91 80 - 100 fL    MCH 30.4 27.0 - 34.0 pg    MCHC 33.5 32.0 - 36.0 g/dL    RDW 12.1 11.0 - 14.5 %    Platelets 245 140 - 440 thou/uL    MPV 10.1 (H) 7.0 - 10.0 fL   Comprehensive Metabolic Panel   Result Value Ref Range    Sodium 134 (L) 136 - 145 mmol/L    Potassium 4.7 3.5 - 5.0 mmol/L    Chloride 95 (L) 98 - 107 mmol/L    CO2 28 22 - 31 mmol/L    Anion Gap, Calculation 11 5 - 18 mmol/L    Glucose 451 (HH) 70 - 125 mg/dL    BUN 15 8 - 22 mg/dL    Creatinine 0.95 0.60 - 1.10 mg/dL    GFR MDRD Af Amer >60 >60 mL/min/1.73m2    GFR MDRD Non Af Amer 60 (L) >60 mL/min/1.73m2    Bilirubin, Total 0.8 0.0 - 1.0 mg/dL    Calcium 9.5 8.5 - 10.5 mg/dL    Protein, Total 7.0 6.0 - 8.0 g/dL    Albumin 3.8 3.5 - 5.0 g/dL    Alkaline Phosphatase 83 45 - 120 U/L    AST 33 0 - 40 U/L    ALT 45 0 - 45 U/L    Narrative    Fasting Glucose reference range is 70-99 mg/dL per  American Diabetes Association (ADA) guidelines.       Blood sugar was very high, it was greater than 400.    She should proceed with the new plan as we  discussed and try to check some blood sugars.    No other significant problem was identified.     Please call with questions or contact us using [employer].    Sincerely,        Electronically signed by Nir Barton MD

## 2021-06-25 NOTE — TELEPHONE ENCOUNTER
Refill Approved    Rx renewed per Medication Renewal Policy. Medication was last renewed on 3/11/21.    Bobby Tejeda, [facility] Triage/Med Refill 6/13/2021     Requested Prescriptions   Pending Prescriptions Disp Refills     lisinopriL (PRINIVIL,ZESTRIL) 2.5 MG tablet [Pharmacy Med Name: Lisinopril Oral Tablet 2.5 MG] 90 tablet 0     Sig: TAKE ONE TABLET BY MOUTH ONE TIME DAILY       Ace Inhibitors Refill Protocol Passed - 6/10/2021  7:44 PM        Passed - PCP or prescribing provider visit in past 12 months       Last office visit with prescriber/PCP: 3/12/2021 Nir Barton MD OR same dept: 3/12/2021 Nir Barton MD OR same specialty: 3/12/2021 Nir Barton MD  Last physical: 3/11/2019 Last MTM visit: Visit date not found   Next visit within 3 mo: Visit date not found  Next physical within 3 mo: Visit date not found  Prescriber OR PCP: Nir Barton MD  Last diagnosis associated with med order: 1. DM (diabetes mellitus) (H)  - lisinopriL (PRINIVIL,ZESTRIL) 2.5 MG tablet [Pharmacy Med Name: Lisinopril Oral Tablet 2.5 MG]; TAKE ONE TABLET BY MOUTH ONE TIME DAILY   Dispense: 90 tablet; Refill: 0    If protocol passes may refill for 12 months if within 3 months of last provider visit (or a total of 15 months).             Passed - Serum Potassium in past 12 months     Lab Results   Component Value Date    Potassium 4.7 03/12/2021             Passed - Blood pressure filed in past 12 months     BP Readings from Last 1 Encounters:   03/12/21 128/76             Passed - Serum Creatinine in past 12 months     Creatinine   Date Value Ref Range Status   03/12/2021 0.95 0.60 - 1.10 mg/dL Final   03/12/2021 0.95 0.60 - 1.10 mg/dL Final

## 2021-06-25 NOTE — TELEPHONE ENCOUNTER
Patient is out of medication- they had contacted the pharmacy last week.I see we received it 6/13/21.

## 2021-06-25 NOTE — PROGRESS NOTES
" Patient ID: Maranda Thrasher is a 58 y.o. female.  /84   Pulse 77   Ht 5' 3\" (1.6 m)   Wt 174 lb 6.4 oz (79.1 kg)   SpO2 98%   BMI 30.89 kg/m      Assessment/Plan:                   Diagnoses and all orders for this visit:    Type 2 diabetes mellitus without complication, without long-term current use of insulin (H)  -     Glycosylated Hemoglobin A1c  -     Glucose    Other hyperlipidemia    History of CVA (cerebrovascular accident)    Seizure disorder (H)    Dysphagia, unspecified type    Other orders  -     Cancel: Lipid [place]  -     Cancel: Mammo Screening Bilateral  -     Cancel: Ambulatory referral for Colonoscopy  -     Cancel: Lipid [place]  -     Cancel: Comprehensive Metabolic Panel  -     Cancel: HM2(CBC w/o Differential)  -     triamcinolone (KENALOG) 0.1 % cream  Dispense: 30 g; Refill: 1  -     Tdap vaccine,  8yo or older,  IM  -     metFORMIN (GLUCOPHAGE) 1000 MG tablet  Dispense: 180 tablet; Refill: 3  -     glipiZIDE (GLUCOTROL XL) 5 MG 24 hr tablet  Dispense: 90 tablet; Refill: 3  -     blood-glucose meter Misc  Dispense: 1 each; Refill: 0  -     blood glucose test (ACCU-CHEK CIARA PLUS TEST STRP) strips  Dispense: 100 strip; Refill: 11  -     generic lancets (FINGERSTIX LANCETS)  Dispense: 100 each; Refill: 11      Recent Results (from the past 240 hour(s))   Glycosylated Hemoglobin A1c    Collection Time: 03/11/19  2:59 PM   Result Value Ref Range    Hemoglobin A1c >14.0 (H) 3.5 - 6.0 %   Glucose    Collection Time: 03/11/19  3:43 PM   Result Value Ref Range    Glucose 360 (H) 70 - 125 mg/dL    Patient Fasting > 8hrs? Unknown        DISCUSSION  Initiate metformin 1000 mg twice daily, glipizide 5 mg extended release once daily.  Check blood sugars, follow-up with me by phone reporting blood sugars in about 2 weeks.  Recommend follow-up in about 6 weeks to reassess overall control.  Subjective:     HPI    Maranda Thrasher is a 58 y.o. female who is here today with her .  She has a " complex medical history including prior stroke from vasculitis and a seizure disorder.  She recently saw her neurologist and was told that everything seemed to be going well.  She reports that medication levels were checked.  Patient overall reports she is doing well however upon checking the labs her A1c is greater than measurable in our lab.  Patient does not endorse distinct symptoms of hyperglycemia.  She does have a known underlying history of type 2 diabetes and had been doing quite well and we discussed scaling back on medications and focusing on diet.  As we reviewed considerations in depth patient is eating multiple highly sugared foods including soda pop.  We discussed the importance of dietary refinements and discussed the importance of initiating medication to gain control of significant hyperglycemia along with short-term follow-up.  She and her  understanding and knowledge of this.  They have been conservative otherwise about routine health prevention.  She declines mammography, colon cancer screening or any gynecologic screening.  Despite discussing that there are multiple options for colon cancer screening patient does not want to even consider this.  She is willing to consider immunization update and we discussed that today.  We discussed other ways to improve her overall health and the importance of maintaining physical activity in addition to dietary changes.  We discussed routine dental care and eye care.  Reviewed considerations regarding potential complications from her diabetes.  She does not report chest pain or shortness of breath although is not very active.  She does not report any neuropathy symptoms in the feet.    Review of Systems  Complete review of systems is obtained.  Other than the specific considerations noted above complete review of systems is negative.          Objective:   Medications:  Current Outpatient Medications   Medication Sig Note     aspirin 81 MG EC tablet  "Take 81 mg by mouth daily.      b complex vitamins tablet Take 1 tablet by mouth daily.      latanoprost (XALATAN) 0.005 % ophthalmic solution  6/9/2016: Received from: External Pharmacy     levETIRAcetam (KEPPRA) 500 MG tablet Take 1 tablet (500 mg total) by mouth 2 (two) times a day.      lisinopril (PRINIVIL,ZESTRIL) 2.5 MG tablet Take 1 tablet (2.5 mg total) by mouth daily.      blood glucose test (ACCU-CHEK CIARA PLUS TEST STRP) strips Used to check blood sugar once daily for management of type 2 diabetes.  Dispense brand per patient's insurance at pharmacy discretion.      blood-glucose meter Misc Used to check blood sugar once daily for management of type 2 diabetes      generic lancets (FINGERSTIX LANCETS) Used to check blood sugar once daily for management of type 2 diabetes. dispense brand per patient's insurance at pharmacy discretion.      glipiZIDE (GLUCOTROL XL) 5 MG 24 hr tablet Take 1 tablet (5 mg total) by mouth daily with breakfast.      metFORMIN (GLUCOPHAGE) 1000 MG tablet Take 1 tablet (1,000 mg total) by mouth 2 (two) times a day with meals.      triamcinolone (KENALOG) 0.1 % cream Apply topically 2 (two) times a day.      VENTOLIN HFA 90 mcg/actuation inhaler  2/13/2017: Received from: External Pharmacy       Allergies:  Allergies   Allergen Reactions     Penicillins        Tobacco:   reports that  has never smoked. she has never used smokeless tobacco.     Physical Exam          /84   Pulse 77   Ht 5' 3\" (1.6 m)   Wt 174 lb 6.4 oz (79.1 kg)   SpO2 98%   BMI 30.89 kg/m          General Appearance:    Alert, cooperative, no distress   Eyes:   No scleral icterus or conjunctival irritation       Ears:    Normal TM's and external ear canals, both ears   Throat:   Lips, mucosa, and tongue normal; teeth and gums normal   Neck:   Supple, symmetrical, trachea midline, no adenopathy;        thyroid:  No enlargement/tenderness/nodules   Lungs:     Clear to auscultation bilaterally, " respirations unlabored, no wheezes or crackles   Heart:    Regular rate and rhythm,  No murmur   Abdomen:    Soft, no distention, no tenderness on palpation, no masses, no organomegaly     Extremities:  No edema, no joint swelling or redness, no evidence of any injuries, palpable dorsalis pedis and posterior tibialis pulses in both feet.   Skin:  No concerning skin findings, no suspicious moles, no rashes   Neurologic:  On gross examination there is no motor or sensory deficit.  Patient walks with a normal gait.  Using the monofilament line on the feet there is no loss of sensation detected

## 2021-06-26 NOTE — TELEPHONE ENCOUNTER
----- Message from Nir Barton MD sent at 6/17/2021  2:09 PM CDT -----  Please call and inform that the A1c has improved but only to 9.0 which is still too high.  We need to discuss further changes to medication.  I would like to arrange for a phone visit with her and her  sometime in the near future to have this discussion.

## 2021-06-26 NOTE — TELEPHONE ENCOUNTER
Can we try to get them scheduled for a virtual visit in the next week or so with DR Barton? 20 minutes is fine. Thank you.

## 2021-07-04 NOTE — LETTER
Letter by Nir Barton MD at      Author: Nir Barton MD Service: -- Author Type: --    Filed:  Encounter Date: 6/17/2021 Status: (Other)         Maranda Thrasher  [address] [place] [id number]         June 17, 2021     Dear Ms. Thrasher,    Below are the results from your recent visit:    Resulted Orders   Basic Metabolic Panel   Result Value Ref Range    Sodium 139 136 - 145 mmol/L    Potassium 5.1 (H) 3.5 - 5.0 mmol/L    Chloride 101 98 - 107 mmol/L    CO2 24 22 - 31 mmol/L    Anion Gap, Calculation 14 5 - 18 mmol/L    Glucose 201 (H) 70 - 125 mg/dL    Calcium 10.0 8.5 - 10.5 mg/dL    BUN 15 8 - 22 mg/dL    Creatinine 0.87 0.60 - 1.10 mg/dL    GFR MDRD Af Amer >60 >60 mL/min/1.73m2    GFR MDRD Non Af Amer >60 >60 mL/min/1.73m2    Narrative    Fasting Glucose reference range is 70-99 mg/dL per  American Diabetes Association (ADA) guidelines.   Glycosylated Hemoglobin A1c   Result Value Ref Range    Hemoglobin A1c 9.0 (H) <=5.6 %     The A1c has improved but only to 9.0 which is still too high.  We need to discuss further changes to medication.  I would like to arrange for a phone visit with her and her  sometime in the near future to have this discussion. Please call  to make an appointment, thank you    Please call with questions or contact us using [employer].    Sincerely,        Electronically signed by Nir Barton MD

## 2022-01-21 ENCOUNTER — TRANSFERRED RECORDS (OUTPATIENT)
Dept: HEALTH INFORMATION MANAGEMENT | Facility: CLINIC | Age: 62
End: 2022-01-21
Payer: COMMERCIAL

## 2022-02-02 ENCOUNTER — MEDICAL CORRESPONDENCE (OUTPATIENT)
Dept: HEALTH INFORMATION MANAGEMENT | Facility: CLINIC | Age: 62
End: 2022-02-02
Payer: COMMERCIAL

## 2022-02-04 ENCOUNTER — OFFICE VISIT (OUTPATIENT)
Dept: FAMILY MEDICINE | Facility: CLINIC | Age: 62
End: 2022-02-04
Payer: COMMERCIAL

## 2022-02-04 VITALS
HEART RATE: 81 BPM | SYSTOLIC BLOOD PRESSURE: 134 MMHG | DIASTOLIC BLOOD PRESSURE: 76 MMHG | BODY MASS INDEX: 31.89 KG/M2 | WEIGHT: 180 LBS | HEIGHT: 63 IN | OXYGEN SATURATION: 99 %

## 2022-02-04 DIAGNOSIS — E11.9 TYPE 2 DIABETES MELLITUS WITHOUT COMPLICATION, WITHOUT LONG-TERM CURRENT USE OF INSULIN (H): Primary | ICD-10-CM

## 2022-02-04 DIAGNOSIS — G40.909 SEIZURE DISORDER (H): ICD-10-CM

## 2022-02-04 LAB
ALBUMIN SERPL-MCNC: 4 G/DL (ref 3.5–5)
ALP SERPL-CCNC: 61 U/L (ref 45–120)
ALT SERPL W P-5'-P-CCNC: 39 U/L (ref 0–45)
ANION GAP SERPL CALCULATED.3IONS-SCNC: 12 MMOL/L (ref 5–18)
AST SERPL W P-5'-P-CCNC: 34 U/L (ref 0–40)
BILIRUB SERPL-MCNC: 1 MG/DL (ref 0–1)
BUN SERPL-MCNC: 14 MG/DL (ref 8–22)
CALCIUM SERPL-MCNC: 10 MG/DL (ref 8.5–10.5)
CHLORIDE BLD-SCNC: 100 MMOL/L (ref 98–107)
CHOLEST SERPL-MCNC: 256 MG/DL
CO2 SERPL-SCNC: 25 MMOL/L (ref 22–31)
CREAT SERPL-MCNC: 0.89 MG/DL (ref 0.6–1.1)
GFR SERPL CREATININE-BSD FRML MDRD: 73 ML/MIN/1.73M2
GLUCOSE BLD-MCNC: 256 MG/DL (ref 70–125)
HBA1C MFR BLD: 10.2 % (ref 0–5.6)
HDLC SERPL-MCNC: 45 MG/DL
HOLD SPECIMEN: NORMAL
LDLC SERPL CALC-MCNC: 162 MG/DL
LEVETIRACETAM (KEPPRA): 7.1 UG/ML (ref 6–46)
POTASSIUM BLD-SCNC: 5.3 MMOL/L (ref 3.5–5)
PROT SERPL-MCNC: 7.6 G/DL (ref 6–8)
SODIUM SERPL-SCNC: 137 MMOL/L (ref 136–145)
TRIGL SERPL-MCNC: 243 MG/DL

## 2022-02-04 PROCEDURE — 80177 DRUG SCRN QUAN LEVETIRACETAM: CPT | Performed by: FAMILY MEDICINE

## 2022-02-04 PROCEDURE — 36415 COLL VENOUS BLD VENIPUNCTURE: CPT | Performed by: FAMILY MEDICINE

## 2022-02-04 PROCEDURE — 83036 HEMOGLOBIN GLYCOSYLATED A1C: CPT | Performed by: FAMILY MEDICINE

## 2022-02-04 PROCEDURE — 99214 OFFICE O/P EST MOD 30 MIN: CPT | Mod: 25 | Performed by: FAMILY MEDICINE

## 2022-02-04 PROCEDURE — 80061 LIPID PANEL: CPT | Performed by: FAMILY MEDICINE

## 2022-02-04 PROCEDURE — 80053 COMPREHEN METABOLIC PANEL: CPT | Performed by: FAMILY MEDICINE

## 2022-02-04 PROCEDURE — 90471 IMMUNIZATION ADMIN: CPT | Performed by: FAMILY MEDICINE

## 2022-02-04 PROCEDURE — 90732 PPSV23 VACC 2 YRS+ SUBQ/IM: CPT | Performed by: FAMILY MEDICINE

## 2022-02-04 RX ORDER — GLIPIZIDE 10 MG/1
10 TABLET, FILM COATED, EXTENDED RELEASE ORAL DAILY
Qty: 90 TABLET | Refills: 3 | Status: SHIPPED | OUTPATIENT
Start: 2022-02-04 | End: 2023-02-07

## 2022-02-04 ASSESSMENT — MIFFLIN-ST. JEOR: SCORE: 1350.6

## 2022-02-04 NOTE — LETTER
February 6, 2022      Maranda Thrasher  [address] [place] [id number]        Dear ,    We are writing to inform you of your test results.    The cholesterol is high.    The potassium level is high secondary to the high blood sugar.    There is no need to change medications or diet at this time.  Take the steps to improve blood sugar that we discussed.  Keep well-hydrated.  Follow-up as discussed.    Resulted Orders   Hemoglobin A1c   Result Value Ref Range    Hemoglobin A1C 10.2 (H) 0.0 - 5.6 %      Comment:      Normal <5.7%   Prediabetes 5.7-6.4%    Diabetes 6.5% or higher     Note: Adopted from ADA consensus guidelines.   Comprehensive metabolic panel   Result Value Ref Range    Sodium 137 136 - 145 mmol/L    Potassium 5.3 (H) 3.5 - 5.0 mmol/L    Chloride 100 98 - 107 mmol/L    Carbon Dioxide (CO2) 25 22 - 31 mmol/L    Anion Gap 12 5 - 18 mmol/L    Urea Nitrogen 14 8 - 22 mg/dL    Creatinine 0.89 0.60 - 1.10 mg/dL    Calcium 10.0 8.5 - 10.5 mg/dL    Glucose 256 (H) 70 - 125 mg/dL    Alkaline Phosphatase 61 45 - 120 U/L    AST 34 0 - 40 U/L    ALT 39 0 - 45 U/L    Protein Total 7.6 6.0 - 8.0 g/dL    Albumin 4.0 3.5 - 5.0 g/dL    Bilirubin Total 1.0 0.0 - 1.0 mg/dL    GFR Estimate 73 >60 mL/min/1.73m2      Comment:      Effective December 21, 2021 eGFRcr in adults is calculated using the 2021 CKD-EPI creatinine equation which includes age and gender (Loco jacobsen al., NEJ, DOI: 10.1056/LSDGcw8863619)   Lipid panel reflex to direct LDL Fasting   Result Value Ref Range    Cholesterol 256 (H) <=199 mg/dL    Triglycerides 243 (H) <=149 mg/dL    Direct Measure HDL 45 (L) >=50 mg/dL      Comment:      HDL Cholesterol Reference Range:     0-2 years:   No reference ranges established for patients under 2 years old  at [facility] for lipid analytes.    2-8 years:  Greater than 45 mg/dL     18 years and older:   Female: Greater than or equal to 50 mg/dL   Male:   Greater than or equal to 40 mg/dL    LDL  Cholesterol Calculated 162 (H) <=129 mg/dL   Keppra (Levetiracetam) Level   Result Value Ref Range    Levetiracetam 7.1 6.0 - 46.0 ug/mL      Comment:      Suggested Trough Concentrations: 6.0 - 46.0 ug/mL       If you have any questions or concerns, please call the clinic at the number listed above.       Sincerely,      Nir Barton MD

## 2022-02-04 NOTE — PROGRESS NOTES
"Maranda Thrasher  /76   Pulse 81   Ht 1.6 m (5' 3\")   Wt 81.6 kg (180 lb)   SpO2 99%   BMI 31.89 kg/m       Assessment/Plan:                Maradna was seen today for diabetes and labs only.    Diagnoses and all orders for this visit:    Type 2 diabetes mellitus without complication, without long-term current use of insulin (H)  -     Hemoglobin A1c  -     Comprehensive metabolic panel  -     Lipid panel reflex to direct LDL Fasting  -     glipiZIDE (GLUCOTROL XL) 10 MG 24 hr tablet; Take 1 tablet (10 mg) by mouth daily    Seizure disorder (H)  -     Keppra (Levetiracetam) Level    Other orders  -     Extra Tube; Future  -     Extra Tube  -     PPSV23, IM/SUBQ (2+ YRS) - [id number]         DISCUSSION  Check labs as above, some labs are needed for her neurologist others for managing diabetes.  Blood sugar is uncontrolled.  Will increase glipizide to 10 mg in place once daily.  Encouraged to check blood sugars which is very difficult for them to do.  Will report any concerns about low blood sugars immediately.  Follow-up in 3 months for repeat labs.  Pneumovax vaccine today.  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    She is on glipizide 5 mg extended release and pioglitazone 30 mg.  Has been intolerant to Metformin.  He is reluctant to agree to medication changes or additions in general.  Admits to high sugar intake since last encounter.  A1c is 10.2.  Reports no concerns for low blood sugars.  Overdue for eye exam last in February 2020.  Denies fever or other concerns.  Declines consideration for medication such as statin or ACE inhibitor.  Denies chest pain shortness of breath or other concerning symptoms.    ROS:  Complete review of systems is obtained.  Other than the specific considerations noted above complete review of systems is " negative.          Objective:   Medications:  Current Outpatient Medications   Medication     aspirin 81 MG EC tablet     betamethasone, augmented, (DIPROLENE) 0.05 % ointment     blood glucose test (ACCU-CHEK CIARA PLUS TEST STRP) strips     blood-glucose meter Misc     generic lancets (FINGERSTIX LANCETS)     glipiZIDE (GLUCOTROL XL) 10 MG 24 hr tablet     latanoprost (XALATAN) 0.005 % ophthalmic solution     levETIRAcetam (KEPPRA) 500 MG tablet     lisinopril (ZESTRIL) 2.5 MG tablet     pioglitazone (ACTOS) 30 MG tablet     triamcinolone (KENALOG) 0.1 % cream     No current facility-administered medications for this visit.        Allergies:     Allergies   Allergen Reactions     Penicillins Unknown        Social History     Socioeconomic History     Marital status:      Spouse name: Not on file     Number of children: Not on file     Years of education: Not on file     Highest education level: Not on file   Occupational History     Not on file   Tobacco Use     Smoking status: Never Smoker     Smokeless tobacco: Never Used   Substance and Sexual Activity     Alcohol use: Not on file     Drug use: Not on file     Sexual activity: Not on file   Other Topics Concern     Not on file   Social History Narrative     Not on file     Social Determinants of Health     Financial Resource Strain: Not on file   Food Insecurity: Not on file   Transportation Needs: Not on file   Physical Activity: Not on file   Stress: Not on file   Social Connections: Not on file   Intimate Partner Violence: Not on file   Housing Stability: Not on file       No family history on file.     Most Recent Immunizations   Administered Date(s) Administered     COVID-19,PF,Moderna 11/08/2021     FLU 6-35 months 10/06/2004     Flu, Unspecified 10/13/2018     Influenza Quad, Recombinant, pf(RIV4) (Flublok) 09/23/2019     Influenza Vaccine IM > 6 months Valent IIV4 (Alfuria,Fluzone) 10/02/2021     Influenza Vaccine, 6+MO IM (QUADRIVALENT  "W/PRESERVATIVES) 09/25/2020     Tdap (Adacel,Boostrix) 03/11/2019   Pended Date(s) Pended     Pneumococcal 23 valent 02/04/2022        Wt Readings from Last 3 Encounters:   02/04/22 81.6 kg (180 lb)   03/12/21 82.1 kg (181 lb)   09/23/19 85.7 kg (189 lb)        BP Readings from Last 6 Encounters:   02/04/22 134/76   03/12/21 128/76   09/23/19 (!) 132/90        Hemoglobin A1C   Date Value Ref Range Status   02/04/2022 10.2 (H) 0.0 - 5.6 % Final     Comment:     Normal <5.7%   Prediabetes 5.7-6.4%    Diabetes 6.5% or higher     Note: Adopted from ADA consensus guidelines.   06/14/2021 9.0 (H) <=5.6 % Final   03/12/2021 10.9 (H) <=5.6 % Final              PHYSICAL EXAM:    /76   Pulse 81   Ht 1.6 m (5' 3\")   Wt 81.6 kg (180 lb)   SpO2 99%   BMI 31.89 kg/m           General Appearance:    Alert, cooperative, no distress   Eyes:   No scleral icterus or conjunctival irritation       Lungs:     Clear to auscultation bilaterally, respirations unlabored, no wheezes or crackles   Heart:    Regular rate and rhythm,  No murmur   Extremities:  No edema, no joint swelling or redness, no evidence of any injuries   Skin:  No concerning skin findings, no suspicious moles, no rashes                                                 "

## 2022-05-02 ENCOUNTER — LAB (OUTPATIENT)
Dept: LAB | Facility: CLINIC | Age: 62
End: 2022-05-02
Payer: COMMERCIAL

## 2022-05-02 DIAGNOSIS — E11.9 TYPE 2 DIABETES MELLITUS WITHOUT COMPLICATION, WITHOUT LONG-TERM CURRENT USE OF INSULIN (H): Primary | ICD-10-CM

## 2022-05-02 DIAGNOSIS — E87.5 HYPERKALEMIA: ICD-10-CM

## 2022-05-02 LAB — HBA1C MFR BLD: 8.6 % (ref 0–5.6)

## 2022-05-02 PROCEDURE — 36415 COLL VENOUS BLD VENIPUNCTURE: CPT

## 2022-05-02 PROCEDURE — 83036 HEMOGLOBIN GLYCOSYLATED A1C: CPT

## 2022-05-04 DIAGNOSIS — E11.9 TYPE 2 DIABETES MELLITUS WITHOUT COMPLICATION, WITHOUT LONG-TERM CURRENT USE OF INSULIN (H): ICD-10-CM

## 2022-05-06 ENCOUNTER — TELEPHONE (OUTPATIENT)
Dept: FAMILY MEDICINE | Facility: CLINIC | Age: 62
End: 2022-05-06
Payer: COMMERCIAL

## 2022-05-06 DIAGNOSIS — E11.9 TYPE 2 DIABETES MELLITUS WITHOUT COMPLICATION, WITHOUT LONG-TERM CURRENT USE OF INSULIN (H): Primary | ICD-10-CM

## 2022-05-06 RX ORDER — PIOGLITAZONEHYDROCHLORIDE 45 MG/1
45 TABLET ORAL DAILY
Qty: 90 TABLET | Refills: 3 | Status: SHIPPED | OUTPATIENT
Start: 2022-05-06 | End: 2023-05-04

## 2022-05-06 NOTE — TELEPHONE ENCOUNTER
Notified patient of results and agrees to the increase in medication. [facility] is [facility] pharmacy

## 2022-05-06 NOTE — TELEPHONE ENCOUNTER
----- Message from Nir Barton MD sent at 5/5/2022 12:25 PM CDT -----  Please call patient: The A1c has shown good improvement but is still too high.  I recommend continued efforts at improving diet and getting more regular activity but I also recommend we increase the pioglitazone to 45 mg daily.  If she is willing to make this change let me know and we will send the prescription to the pharmacy.

## 2022-05-07 RX ORDER — PIOGLITAZONEHYDROCHLORIDE 30 MG/1
TABLET ORAL
Qty: 90 TABLET | Refills: 0 | OUTPATIENT
Start: 2022-05-07

## 2023-02-03 ENCOUNTER — LAB (OUTPATIENT)
Dept: LAB | Facility: CLINIC | Age: 63
End: 2023-02-03
Payer: COMMERCIAL

## 2023-02-03 ENCOUNTER — TRANSFERRED RECORDS (OUTPATIENT)
Dept: HEALTH INFORMATION MANAGEMENT | Facility: CLINIC | Age: 63
End: 2023-02-03

## 2023-02-03 DIAGNOSIS — G40.109 LOCALIZATION-RELATED FOCAL EPILEPSY WITH SIMPLE PARTIAL SEIZURES (H): ICD-10-CM

## 2023-02-03 DIAGNOSIS — G40.209 COMPLEX PARTIAL SEIZURES WITH CONSCIOUSNESS IMPAIRED (H): Primary | ICD-10-CM

## 2023-02-03 LAB
ANION GAP SERPL CALCULATED.3IONS-SCNC: 11 MMOL/L (ref 7–15)
BUN SERPL-MCNC: 13.4 MG/DL (ref 8–23)
CALCIUM SERPL-MCNC: 9.9 MG/DL (ref 8.8–10.2)
CHLORIDE SERPL-SCNC: 101 MMOL/L (ref 98–107)
CREAT SERPL-MCNC: 0.82 MG/DL (ref 0.51–0.95)
DEPRECATED HCO3 PLAS-SCNC: 28 MMOL/L (ref 22–29)
GFR SERPL CREATININE-BSD FRML MDRD: 80 ML/MIN/1.73M2
GLUCOSE SERPL-MCNC: 148 MG/DL (ref 70–99)
LEVETIRACETAM SERPL-MCNC: 18.1 ΜG/ML (ref 10–40)
POTASSIUM SERPL-SCNC: 4.3 MMOL/L (ref 3.4–5.3)
SODIUM SERPL-SCNC: 140 MMOL/L (ref 136–145)

## 2023-02-03 PROCEDURE — 36415 COLL VENOUS BLD VENIPUNCTURE: CPT | Mod: 59

## 2023-02-03 PROCEDURE — 80048 BASIC METABOLIC PNL TOTAL CA: CPT

## 2023-02-03 PROCEDURE — 80177 DRUG SCRN QUAN LEVETIRACETAM: CPT

## 2023-02-03 PROCEDURE — 36415 COLL VENOUS BLD VENIPUNCTURE: CPT

## 2023-02-09 DIAGNOSIS — E11.9 TYPE 2 DIABETES MELLITUS WITHOUT COMPLICATION, WITHOUT LONG-TERM CURRENT USE OF INSULIN (H): Primary | ICD-10-CM

## 2023-02-27 DIAGNOSIS — E11.9 DM (DIABETES MELLITUS) (H): ICD-10-CM

## 2023-02-27 NOTE — TELEPHONE ENCOUNTER
"Routing refill request to provider for review/approval because:  Patient needs to be seen because it has been more than 1 year since last office visit.    Last Written Prescription Date:  1/3/22  Last Fill Quantity: 90,  # refills: 3   Last office visit provider:  2/4/22     Requested Prescriptions   Pending Prescriptions Disp Refills     lisinopril (ZESTRIL) 2.5 MG tablet [Pharmacy Med Name: Lisinopril Oral Tablet 2.5 MG] 90 tablet 0     Sig: TAKE ONE TABLET BY MOUTH ONE TIME DAILY       ACE Inhibitors (Including Combos) Protocol Failed - 2/27/2023  2:00 AM        Failed - Blood pressure under 140/90 in past 12 months     BP Readings from Last 3 Encounters:   02/04/22 134/76   03/12/21 128/76   09/23/19 (!) 132/90                 Failed - Recent (12 mo) or future (30 days) visit within the authorizing provider's specialty     Patient has had an office visit with the authorizing provider or a provider within the authorizing providers department within the previous 12 mos or has a future within next 30 days. See \"Patient Info\" tab in inbasket, or \"Choose Columns\" in Meds & Orders section of the refill encounter.              Passed - Medication is active on med list        Passed - Patient is age 18 or older        Passed - No active pregnancy on record        Passed - Normal serum creatinine on file in past 12 months     Recent Labs   Lab Test 02/03/23  1050   CR 0.82       Ok to refill medication if creatinine is low          Passed - Normal serum potassium on file in past 12 months     Recent Labs   Lab Test 02/03/23  1050   POTASSIUM 4.3             Passed - No positive pregnancy test within past 12 months             Trudi Noriega, RN 02/27/23 5:28 PM  "

## 2023-02-28 RX ORDER — LISINOPRIL 2.5 MG/1
TABLET ORAL
Qty: 90 TABLET | Refills: 0 | Status: SHIPPED | OUTPATIENT
Start: 2023-02-28 | End: 2023-05-29

## 2023-05-03 DIAGNOSIS — E11.9 TYPE 2 DIABETES MELLITUS WITHOUT COMPLICATION, WITHOUT LONG-TERM CURRENT USE OF INSULIN (H): ICD-10-CM

## 2023-05-04 RX ORDER — PIOGLITAZONEHYDROCHLORIDE 45 MG/1
45 TABLET ORAL DAILY
Qty: 90 TABLET | Refills: 0 | Status: SHIPPED | OUTPATIENT
Start: 2023-05-04 | End: 2023-07-05

## 2023-05-04 NOTE — TELEPHONE ENCOUNTER
"Routing refill request to provider for review/approval because:  Labs not current:  ALT, AST, A1c  Patient needs to be seen    Last Written Prescription Date:  5/6/2022  Last Fill Quantity: 90,  # refills: 3   Last office visit provider:  2/4/2022     Requested Prescriptions   Pending Prescriptions Disp Refills     pioglitazone (ACTOS) 45 MG tablet [Pharmacy Med Name: Pioglitazone HCl Oral Tablet 45 MG] 90 tablet 0     Sig: Take 1 tablet (45 mg) by mouth daily       Thiazolidinedione Agents (TZDs)  Failed - 5/3/2023  6:48 PM        Failed - Patient has a normal ALT within the past 12 mos.     Recent Labs   Lab Test 02/04/22  0912   ALT 39             Failed - Patient has a normal AST within the past 12 mos.      Recent Labs   Lab Test 02/04/22 0912   AST 34             Failed - Patient has documented A1c within the specified period of time.     If HgbA1C is 8 or greater, it needs to be on file within the past 3 months.  If less than 8, must be on file within the past 6 months.     Recent Labs   Lab Test 05/02/22  1454   A1C 8.6*             Failed - Recent (6 mo) or future (30 days) visit within the authorizing provider's specialty     Patient had office visit in the last 6 months or has a visit in the next 30 days with authorizing provider or within the authorizing provider's specialty.  See \"Patient Info\" tab in inbasket, or \"Choose Columns\" in Meds & Orders section of the refill encounter.            Passed - Diagnosis not CHF        Passed - Medication is active on med list        Passed - Patient is age 18 or older        Passed - Patient is not pregnant        Passed - Patient has a normal serum Creatinine in the past 12 months     Recent Labs   Lab Test 02/03/23  1050   CR 0.82       Ok to refill medication if creatinine is low          Passed - Patient has not had a positive pregnancy test within the past 12 mos.             Urmila Cardoza RN 05/04/23 4:02 PM  "

## 2023-05-17 ENCOUNTER — TELEPHONE (OUTPATIENT)
Dept: NURSING | Facility: CLINIC | Age: 63
End: 2023-05-17
Payer: COMMERCIAL

## 2023-05-17 NOTE — TELEPHONE ENCOUNTER
Spouse calling to report insurance carrier has changed, requiring future prescription renewals to be written for 90 days in quantity.   Consent to communicate on file.    Denies need for Rx refills today. Advised to contact pharmacy when needing refill.    Beth Guy RN, BSN  5/17/2023 at 4:09 PM  [facility]

## 2023-05-29 DIAGNOSIS — E11.9 DM (DIABETES MELLITUS) (H): ICD-10-CM

## 2023-05-29 RX ORDER — LISINOPRIL 2.5 MG/1
TABLET ORAL
Qty: 90 TABLET | Refills: 0 | Status: SHIPPED | OUTPATIENT
Start: 2023-05-29 | End: 2023-08-29

## 2023-05-29 NOTE — TELEPHONE ENCOUNTER
"Routing refill request to provider for review/approval because:  Patient needs to be seen because it has been more than 1 year since last office visit.    Last Written Prescription Date:  2/28/2023  Last Fill Quantity: 90,  # refills: 0   Last office visit provider:  2/4/2022   Future visit 7/11/2023  Requested Prescriptions   Pending Prescriptions Disp Refills     lisinopril (ZESTRIL) 2.5 MG tablet [Pharmacy Med Name: Lisinopril Oral Tablet 2.5 MG] 90 tablet 0     Sig: TAKE ONE TABLET BY MOUTH ONE TIME DAILY       ACE Inhibitors (Including Combos) Protocol Failed - 5/29/2023 12:40 PM        Failed - Blood pressure under 140/90 in past 12 months     BP Readings from Last 3 Encounters:   02/04/22 134/76   03/12/21 128/76   09/23/19 (!) 132/90                 Failed - Recent (12 mo) or future (30 days) visit within the authorizing provider's specialty     Patient has had an office visit with the authorizing provider or a provider within the authorizing providers department within the previous 12 mos or has a future within next 30 days. See \"Patient Info\" tab in inbasket, or \"Choose Columns\" in Meds & Orders section of the refill encounter.              Passed - Medication is active on med list        Passed - Patient is age 18 or older        Passed - No active pregnancy on record        Passed - Normal serum creatinine on file in past 12 months     Recent Labs   Lab Test 02/03/23  1050   CR 0.82       Ok to refill medication if creatinine is low          Passed - Normal serum potassium on file in past 12 months     Recent Labs   Lab Test 02/03/23  1050   POTASSIUM 4.3             Passed - No positive pregnancy test within past 12 months             Tata Urias RN 05/29/23 12:40 PM  "

## 2023-05-31 DIAGNOSIS — E11.9 DM (DIABETES MELLITUS) (H): ICD-10-CM

## 2023-06-01 ENCOUNTER — TRANSFERRED RECORDS (OUTPATIENT)
Dept: MULTI SPECIALTY CLINIC | Facility: CLINIC | Age: 63
End: 2023-06-01

## 2023-06-01 LAB — RETINOPATHY: NORMAL

## 2023-06-01 RX ORDER — LISINOPRIL 2.5 MG/1
TABLET ORAL
Qty: 90 TABLET | Refills: 0 | OUTPATIENT
Start: 2023-06-01

## 2023-06-01 NOTE — TELEPHONE ENCOUNTER
This refill request is a duplicate request, previously received or sent.  Sent denial notification to pharmacy.    HSANA LeavittN, RN  [facility]

## 2023-07-05 DIAGNOSIS — E11.9 TYPE 2 DIABETES MELLITUS WITHOUT COMPLICATION, WITHOUT LONG-TERM CURRENT USE OF INSULIN (H): ICD-10-CM

## 2023-07-05 RX ORDER — PIOGLITAZONEHYDROCHLORIDE 45 MG/1
45 TABLET ORAL DAILY
Qty: 90 TABLET | Refills: 1 | Status: SHIPPED | OUTPATIENT
Start: 2023-07-05 | End: 2023-12-27

## 2023-07-05 NOTE — TELEPHONE ENCOUNTER
Pending Prescriptions:                       Disp   Refills    pioglitazone (ACTOS) 45 MG tablet         90 tab*0            Sig: Take 1 tablet (45 mg) by mouth daily

## 2023-07-05 NOTE — TELEPHONE ENCOUNTER
"Routing refill request to provider for review/approval because:  Labs not current:  Multiple  Early refill requested.    Last Written Prescription Date:  5/4/23  Last Fill Quantity: 90,  # refills: 0   Last office visit provider:  2/4/22     Requested Prescriptions   Pending Prescriptions Disp Refills     pioglitazone (ACTOS) 45 MG tablet 90 tablet 0     Sig: Take 1 tablet (45 mg) by mouth daily       Thiazolidinedione Agents (TZDs)  Failed - 7/5/2023  1:50 PM        Failed - Patient has a normal ALT within the past 12 mos.     Recent Labs   Lab Test 02/04/22  0912   ALT 39             Failed - Patient has a normal AST within the past 12 mos.      Recent Labs   Lab Test 02/04/22  0912   AST 34             Failed - Patient has documented A1c within the specified period of time.     If HgbA1C is 8 or greater, it needs to be on file within the past 3 months.  If less than 8, must be on file within the past 6 months.     Recent Labs   Lab Test 05/02/22  1454   A1C 8.6*             Passed - Diagnosis not CHF        Passed - Medication is active on med list        Passed - Patient is age 18 or older        Passed - Patient is not pregnant        Passed - Patient has a normal serum Creatinine in the past 12 months     Recent Labs   Lab Test 02/03/23  1050   CR 0.82       Ok to refill medication if creatinine is low          Passed - Patient has not had a positive pregnancy test within the past 12 mos.        Passed - Recent (6 mo) or future (30 days) visit within the authorizing provider's specialty     Patient had office visit in the last 6 months or has a visit in the next 30 days with authorizing provider or within the authorizing provider's specialty.  See \"Patient Info\" tab in inbasket, or \"Choose Columns\" in Meds & Orders section of the refill encounter.                 Bobby Tejeda RN 07/05/23 1:50 PM  "

## 2023-07-11 ENCOUNTER — OFFICE VISIT (OUTPATIENT)
Dept: FAMILY MEDICINE | Facility: CLINIC | Age: 63
End: 2023-07-11
Payer: COMMERCIAL

## 2023-07-11 VITALS
TEMPERATURE: 98.6 F | OXYGEN SATURATION: 98 % | HEIGHT: 63 IN | DIASTOLIC BLOOD PRESSURE: 76 MMHG | HEART RATE: 99 BPM | BODY MASS INDEX: 37.92 KG/M2 | SYSTOLIC BLOOD PRESSURE: 138 MMHG | WEIGHT: 214 LBS | RESPIRATION RATE: 18 BRPM

## 2023-07-11 DIAGNOSIS — Z86.79 HISTORY OF VASCULITIS: ICD-10-CM

## 2023-07-11 DIAGNOSIS — G40.909 SEIZURE DISORDER (H): ICD-10-CM

## 2023-07-11 DIAGNOSIS — E78.5 HYPERLIPIDEMIA, UNSPECIFIED HYPERLIPIDEMIA TYPE: ICD-10-CM

## 2023-07-11 DIAGNOSIS — Z23 NEED FOR SHINGLES VACCINE: ICD-10-CM

## 2023-07-11 DIAGNOSIS — Z12.4 CERVICAL CANCER SCREENING: ICD-10-CM

## 2023-07-11 DIAGNOSIS — E11.9 TYPE 2 DIABETES MELLITUS WITHOUT COMPLICATION, WITHOUT LONG-TERM CURRENT USE OF INSULIN (H): Primary | ICD-10-CM

## 2023-07-11 DIAGNOSIS — I63.9 CEREBROVASCULAR ACCIDENT (CVA), UNSPECIFIED MECHANISM (H): ICD-10-CM

## 2023-07-11 DIAGNOSIS — I10 ESSENTIAL HYPERTENSION: ICD-10-CM

## 2023-07-11 LAB
ALBUMIN SERPL BCG-MCNC: 4.3 G/DL (ref 3.5–5.2)
ALP SERPL-CCNC: 67 U/L (ref 35–104)
ALT SERPL W P-5'-P-CCNC: 22 U/L (ref 0–50)
ANION GAP SERPL CALCULATED.3IONS-SCNC: 10 MMOL/L (ref 7–15)
AST SERPL W P-5'-P-CCNC: 20 U/L (ref 0–45)
BILIRUB SERPL-MCNC: 0.4 MG/DL
BUN SERPL-MCNC: 15.8 MG/DL (ref 8–23)
CALCIUM SERPL-MCNC: 9.8 MG/DL (ref 8.8–10.2)
CHLORIDE SERPL-SCNC: 102 MMOL/L (ref 98–107)
CREAT SERPL-MCNC: 0.77 MG/DL (ref 0.51–0.95)
DEPRECATED HCO3 PLAS-SCNC: 28 MMOL/L (ref 22–29)
GFR SERPL CREATININE-BSD FRML MDRD: 87 ML/MIN/1.73M2
GLUCOSE SERPL-MCNC: 206 MG/DL (ref 70–99)
HBA1C MFR BLD: 7.6 % (ref 0–5.6)
POTASSIUM SERPL-SCNC: 5 MMOL/L (ref 3.4–5.3)
PROT SERPL-MCNC: 7.6 G/DL (ref 6.4–8.3)
SODIUM SERPL-SCNC: 140 MMOL/L (ref 136–145)

## 2023-07-11 PROCEDURE — 90471 IMMUNIZATION ADMIN: CPT | Performed by: FAMILY MEDICINE

## 2023-07-11 PROCEDURE — 83036 HEMOGLOBIN GLYCOSYLATED A1C: CPT | Performed by: FAMILY MEDICINE

## 2023-07-11 PROCEDURE — 90750 HZV VACC RECOMBINANT IM: CPT | Performed by: FAMILY MEDICINE

## 2023-07-11 PROCEDURE — 99207 PR FOOT EXAM NO CHARGE: CPT | Performed by: FAMILY MEDICINE

## 2023-07-11 PROCEDURE — 80053 COMPREHEN METABOLIC PANEL: CPT | Performed by: FAMILY MEDICINE

## 2023-07-11 PROCEDURE — 99214 OFFICE O/P EST MOD 30 MIN: CPT | Mod: 25 | Performed by: FAMILY MEDICINE

## 2023-07-11 PROCEDURE — 36415 COLL VENOUS BLD VENIPUNCTURE: CPT | Performed by: FAMILY MEDICINE

## 2023-07-11 RX ORDER — ROSUVASTATIN CALCIUM 10 MG/1
10 TABLET, COATED ORAL DAILY
Qty: 90 TABLET | Refills: 3 | Status: SHIPPED | OUTPATIENT
Start: 2023-07-11 | End: 2024-06-21

## 2023-07-11 ASSESSMENT — ENCOUNTER SYMPTOMS
COUGH: 0
HEMATOCHEZIA: 0
CHILLS: 0
HEARTBURN: 1
HEMATURIA: 0
FREQUENCY: 0
DIARRHEA: 0
PARESTHESIAS: 1
DIZZINESS: 0
CONSTIPATION: 0
NERVOUS/ANXIOUS: 0
ARTHRALGIAS: 0
BREAST MASS: 0
SORE THROAT: 0
DYSURIA: 0
SHORTNESS OF BREATH: 0
WEAKNESS: 0
PALPITATIONS: 0
NAUSEA: 0
JOINT SWELLING: 0
FEVER: 0
ABDOMINAL PAIN: 0
MYALGIAS: 0
HEADACHES: 0
EYE PAIN: 0

## 2023-07-11 ASSESSMENT — PAIN SCALES - GENERAL: PAINLEVEL: NO PAIN (0)

## 2023-07-11 NOTE — PROGRESS NOTES
"Maranda Thrasher  /76   Pulse 99   Temp 98.6  F (37  C)   Resp 18   Ht 1.6 m (5' 3\")   Wt 97.1 kg (214 lb)   SpO2 98%   BMI 37.91 kg/m       Assessment/Plan:                Maranda was seen today for recheck medication, physical and diabetes.    Diagnoses and all orders for this visit:    Type 2 diabetes mellitus without complication, without long-term current use of insulin (H)  -     Comprehensive metabolic panel (BMP + Alb, Alk Phos, ALT, AST, Total. Bili, TP)  -     Cancel: Hemoglobin A1c  -     Hemoglobin A1c    Visit for screening mammogram    Screen for colon cancer    Cervical cancer screening    Cerebrovascular accident (CVA), unspecified mechanism (H)    Hyperlipidemia, unspecified hyperlipidemia type    Seizure disorder (H)    Essential hypertension    History of vasculitis         DISCUSSION  continue current medications and rosuvastatin 10 mg daily. See discussion below. Recommend follow-up in six months. Shingles vaccine administered today. Continue to follow specialty providers.  Subjective:     HPI:    Maranda Thrasher is a 62 year old female with a history of type II diabetes on pioglitazone and glipizide. She is a distant history of vasculitis and this ultimately resulted in a stroke and seizure disorder. She is followed now with no random neurologic clinic. I reviewed the most recent consultation note from her neurologist noting no concerns. She also has underlying hypertension dyslipidemia.    A1c is improved to 7.6 blood sugars are not checked daily. Patient working to improve diet overall. No hypoglycemic concerns are noted. Blood pressure is reasonable but not ideal. Patient remains on medications as listed. Patient prefers to remain very conservative about her medication treatment and testing. She does have hyperlipidemia and would benefit from vascular disease risk reduction. She is agreeable today to starting statin therapy. We discussed starting rosuvastatin. She remains on " lisinopril 2.5 mg. She has had historically normal renal function. She has no history of neuropathy. A foot exam is performed today, see below. It is reported she has seen an eye care provider. As part of her stroke she does have a visual field deficit. It is not reported that there any concerns currently regarding diabetic retinopathy.    Today we discussed routine screening and health prevention she is willing to get a shingles vaccine. We discussed colon cancer screening, breast cancer screening she declines any health preventive screening at this time.    ROS:  Complete review of systems is obtained.  Other than the specific considerations noted above complete review of systems is negative.          Objective:   Medications:  Current Outpatient Medications   Medication     aspirin 81 MG EC tablet     betamethasone, augmented, (DIPROLENE) 0.05 % ointment     blood glucose test (ACCU-CHEK CIARA PLUS TEST STRP) strips     blood-glucose meter Misc     generic lancets (FINGERSTIX LANCETS)     glipiZIDE (GLUCOTROL XL) 10 MG 24 hr tablet     latanoprost (XALATAN) 0.005 % ophthalmic solution     levETIRAcetam (KEPPRA) 500 MG tablet     lisinopril (ZESTRIL) 2.5 MG tablet     pioglitazone (ACTOS) 45 MG tablet     triamcinolone (KENALOG) 0.1 % cream     No current facility-administered medications for this visit.        Allergies:     Allergies   Allergen Reactions     Penicillins Unknown        Social History     Socioeconomic History     Marital status:      Spouse name: Not on file     Number of children: Not on file     Years of education: Not on file     Highest education level: Not on file   Occupational History     Not on file   Tobacco Use     Smoking status: Never     Smokeless tobacco: Never   Substance and Sexual Activity     Alcohol use: Not on file     Drug use: Not on file     Sexual activity: Not on file   Other Topics Concern     Not on file   Social History Narrative     Not on file     Social  "Determinants of Health     Financial Resource Strain: Not on file   Food Insecurity: Not on file   Transportation Needs: Not on file   Physical Activity: Not on file   Stress: Not on file   Social Connections: Not on file   Intimate Partner Violence: Not on file   Housing Stability: Not on file       No family history on file.     Most Recent Immunizations   Administered Date(s) Administered     COVID-19 Bivalent 18+ (Moderna) 10/08/2022     COVID-19 Monovalent 18+ (Moderna) 04/09/2022     FLU 6-35 months 10/06/2004     Flu, Unspecified 10/13/2018     Influenza Vaccine 50-64 or 18-64 w/egg allergy (Flublok) 10/08/2022     Influenza Vaccine >6 months (Alfuria,Fluzone) 10/02/2021     Influenza Vaccine, 6+MO IM (QUADRIVALENT W/PRESERVATIVES) 09/25/2020     Pneumococcal 23 valent 02/04/2022     TDAP (Adacel,Boostrix) 03/11/2019        Wt Readings from Last 3 Encounters:   07/11/23 97.1 kg (214 lb)   02/04/22 81.6 kg (180 lb)   03/12/21 82.1 kg (181 lb)        BP Readings from Last 6 Encounters:   07/11/23 138/76   02/04/22 134/76   03/12/21 128/76   09/23/19 (!) 132/90        Hemoglobin A1C   Date Value Ref Range Status   07/11/2023 7.6 (H) 0.0 - 5.6 % Final     Comment:     Normal <5.7%   Prediabetes 5.7-6.4%    Diabetes 6.5% or higher     Note: Adopted from ADA consensus guidelines.   05/02/2022 8.6 (H) 0.0 - 5.6 % Final     Comment:     Normal <5.7%   Prediabetes 5.7-6.4%    Diabetes 6.5% or higher     Note: Adopted from ADA consensus guidelines.   02/04/2022 10.2 (H) 0.0 - 5.6 % Final     Comment:     Normal <5.7%   Prediabetes 5.7-6.4%    Diabetes 6.5% or higher     Note: Adopted from ADA consensus guidelines.              PHYSICAL EXAM:    /76   Pulse 99   Temp 98.6  F (37  C)   Resp 18   Ht 1.6 m (5' 3\")   Wt 97.1 kg (214 lb)   SpO2 98%   BMI 37.91 kg/m           General Appearance:    Alert, cooperative, no distress   Eyes:   No scleral icterus or conjunctival irritation       Lungs:     Clear to " auscultation bilaterally, respirations unlabored, no wheezes or crackles   Heart:    Regular rate and rhythm,  No murmur   Abdomen:    Soft, no distention     Extremities:  No edema, no joint swelling or redness, no evidence of any injuries, palpable dorsalis pedis pulses bilaterally. Past Caymus on both feet discussed briefly. No other foot deformities. No ulcerations, no callus formation.    Skin:  chronic diffuse dermatitis skin rash unchanged from previous.    Neurologic:  using monofilament line no loss of sensation detected in the distal aspect of the toes.

## 2023-07-11 NOTE — LETTER
July 13, 2023      Maranda Thrasher  [address] [facility] [place] [address]        Dear ,    We are writing to inform you of your test results.    The blood sugar, coal glucose was a little bit high on this occasion but all of the other tests were in the normal range. Follow-up as discussed    Resulted Orders   Comprehensive metabolic panel (BMP + Alb, Alk Phos, ALT, AST, Total. Bili, TP)   Result Value Ref Range    Sodium 140 136 - 145 mmol/L    Potassium 5.0 3.4 - 5.3 mmol/L    Chloride 102 98 - 107 mmol/L    Carbon Dioxide (CO2) 28 22 - 29 mmol/L    Anion Gap 10 7 - 15 mmol/L    Urea Nitrogen 15.8 8.0 - 23.0 mg/dL    Creatinine 0.77 0.51 - 0.95 mg/dL    Calcium 9.8 8.8 - 10.2 mg/dL    Glucose 206 (H) 70 - 99 mg/dL    Alkaline Phosphatase 67 35 - 104 U/L    AST 20 0 - 45 U/L      Comment:      Reference intervals for this test were updated on 6/12/2023 to more accurately reflect our healthy population. There may be differences in the flagging of prior results with similar values performed with this method. Interpretation of those prior results can be made in the context of the updated reference intervals.    ALT 22 0 - 50 U/L      Comment:      Reference intervals for this test were updated on 6/12/2023 to more accurately reflect our healthy population. There may be differences in the flagging of prior results with similar values performed with this method. Interpretation of those prior results can be made in the context of the updated reference intervals.      Protein Total 7.6 6.4 - 8.3 g/dL    Albumin 4.3 3.5 - 5.2 g/dL    Bilirubin Total 0.4 <=1.2 mg/dL    GFR Estimate 87 >60 mL/min/1.73m2   Hemoglobin A1c   Result Value Ref Range    Hemoglobin A1C 7.6 (H) 0.0 - 5.6 %      Comment:      Normal <5.7%   Prediabetes 5.7-6.4%    Diabetes 6.5% or higher     Note: Adopted from ADA consensus guidelines.       If you have any questions or concerns, please call the clinic at the number listed above.        Sincerely,      Nir Barton MD

## 2023-08-29 DIAGNOSIS — E11.9 DM (DIABETES MELLITUS) (H): ICD-10-CM

## 2023-08-29 RX ORDER — LISINOPRIL 2.5 MG/1
2.5 TABLET ORAL DAILY
Qty: 90 TABLET | Refills: 3 | Status: SHIPPED | OUTPATIENT
Start: 2023-08-29 | End: 2024-08-19

## 2023-10-14 ENCOUNTER — HEALTH MAINTENANCE LETTER (OUTPATIENT)
Age: 63
End: 2023-10-14

## 2023-12-18 DIAGNOSIS — E11.9 TYPE 2 DIABETES MELLITUS WITHOUT COMPLICATION, WITHOUT LONG-TERM CURRENT USE OF INSULIN (H): ICD-10-CM

## 2023-12-18 RX ORDER — GLIPIZIDE 10 MG/1
TABLET, FILM COATED, EXTENDED RELEASE ORAL
Qty: 90 TABLET | Refills: 2 | OUTPATIENT
Start: 2023-12-18

## 2023-12-18 RX ORDER — GLIPIZIDE 10 MG/1
10 TABLET, FILM COATED, EXTENDED RELEASE ORAL DAILY
Qty: 90 TABLET | Refills: 0 | Status: SHIPPED | OUTPATIENT
Start: 2023-12-18 | End: 2024-03-13

## 2023-12-25 DIAGNOSIS — E11.9 TYPE 2 DIABETES MELLITUS WITHOUT COMPLICATION, WITHOUT LONG-TERM CURRENT USE OF INSULIN (H): ICD-10-CM

## 2023-12-27 RX ORDER — PIOGLITAZONEHYDROCHLORIDE 45 MG/1
45 TABLET ORAL DAILY
Qty: 90 TABLET | Refills: 0 | Status: SHIPPED | OUTPATIENT
Start: 2023-12-27 | End: 2024-03-25

## 2024-01-02 ENCOUNTER — OFFICE VISIT (OUTPATIENT)
Dept: FAMILY MEDICINE | Facility: CLINIC | Age: 64
End: 2024-01-02
Payer: COMMERCIAL

## 2024-01-02 VITALS
SYSTOLIC BLOOD PRESSURE: 134 MMHG | HEART RATE: 80 BPM | DIASTOLIC BLOOD PRESSURE: 78 MMHG | OXYGEN SATURATION: 99 % | TEMPERATURE: 98.6 F | RESPIRATION RATE: 18 BRPM | BODY MASS INDEX: 38.54 KG/M2 | WEIGHT: 217.5 LBS | HEIGHT: 63 IN

## 2024-01-02 DIAGNOSIS — R32 URINARY INCONTINENCE, UNSPECIFIED TYPE: ICD-10-CM

## 2024-01-02 DIAGNOSIS — E11.9 TYPE 2 DIABETES MELLITUS WITHOUT COMPLICATION, WITHOUT LONG-TERM CURRENT USE OF INSULIN (H): Primary | ICD-10-CM

## 2024-01-02 DIAGNOSIS — L98.9: ICD-10-CM

## 2024-01-02 DIAGNOSIS — I10 ESSENTIAL HYPERTENSION: ICD-10-CM

## 2024-01-02 DIAGNOSIS — E78.5 HYPERLIPIDEMIA, UNSPECIFIED HYPERLIPIDEMIA TYPE: ICD-10-CM

## 2024-01-02 LAB
ALBUMIN SERPL BCG-MCNC: 4.2 G/DL (ref 3.5–5.2)
ALP SERPL-CCNC: 58 U/L (ref 40–150)
ALT SERPL W P-5'-P-CCNC: 19 U/L (ref 0–50)
ANION GAP SERPL CALCULATED.3IONS-SCNC: 11 MMOL/L (ref 7–15)
AST SERPL W P-5'-P-CCNC: 22 U/L (ref 0–45)
BILIRUB SERPL-MCNC: 0.5 MG/DL
BUN SERPL-MCNC: 17.3 MG/DL (ref 8–23)
CALCIUM SERPL-MCNC: 9.8 MG/DL (ref 8.8–10.2)
CHLORIDE SERPL-SCNC: 102 MMOL/L (ref 98–107)
CHOLEST SERPL-MCNC: 152 MG/DL
CREAT SERPL-MCNC: 0.79 MG/DL (ref 0.51–0.95)
DEPRECATED HCO3 PLAS-SCNC: 27 MMOL/L (ref 22–29)
EGFRCR SERPLBLD CKD-EPI 2021: 84 ML/MIN/1.73M2
GLUCOSE SERPL-MCNC: 180 MG/DL (ref 70–99)
HBA1C MFR BLD: 7.9 % (ref 0–5.6)
HDLC SERPL-MCNC: 48 MG/DL
LDLC SERPL CALC-MCNC: 76 MG/DL
NONHDLC SERPL-MCNC: 104 MG/DL
POTASSIUM SERPL-SCNC: 5.4 MMOL/L (ref 3.4–5.3)
PROT SERPL-MCNC: 7.5 G/DL (ref 6.4–8.3)
SODIUM SERPL-SCNC: 140 MMOL/L (ref 135–145)
TRIGL SERPL-MCNC: 141 MG/DL

## 2024-01-02 PROCEDURE — 90678 RSV VACC PREF BIVALENT IM: CPT | Performed by: FAMILY MEDICINE

## 2024-01-02 PROCEDURE — 36415 COLL VENOUS BLD VENIPUNCTURE: CPT | Performed by: FAMILY MEDICINE

## 2024-01-02 PROCEDURE — 80053 COMPREHEN METABOLIC PANEL: CPT | Performed by: FAMILY MEDICINE

## 2024-01-02 PROCEDURE — 80061 LIPID PANEL: CPT | Performed by: FAMILY MEDICINE

## 2024-01-02 PROCEDURE — 99214 OFFICE O/P EST MOD 30 MIN: CPT | Mod: 25 | Performed by: FAMILY MEDICINE

## 2024-01-02 PROCEDURE — 90750 HZV VACC RECOMBINANT IM: CPT | Performed by: FAMILY MEDICINE

## 2024-01-02 PROCEDURE — 83036 HEMOGLOBIN GLYCOSYLATED A1C: CPT | Performed by: FAMILY MEDICINE

## 2024-01-02 PROCEDURE — 90471 IMMUNIZATION ADMIN: CPT | Performed by: FAMILY MEDICINE

## 2024-01-02 PROCEDURE — 90472 IMMUNIZATION ADMIN EACH ADD: CPT | Performed by: FAMILY MEDICINE

## 2024-01-02 RX ORDER — NYSTATIN 100000 U/G
CREAM TOPICAL 2 TIMES DAILY
Qty: 30 G | Refills: 3 | Status: SHIPPED | OUTPATIENT
Start: 2024-01-02 | End: 2024-09-26

## 2024-01-02 NOTE — PROGRESS NOTES
"Prior to immunization administration, verified patients identity using patient s name and date of birth. Please see Immunization Activity for additional information.     Screening Questionnaire for Adult Immunization    Are you sick today?   No   Do you have allergies to medications, food, a vaccine component or latex?   No   Have you ever had a serious reaction after receiving a vaccination?   No   Do you have a long-term health problem with heart, lung, kidney, or metabolic disease (e.g., diabetes), asthma, a blood disorder, no spleen, complement component deficiency, a cochlear implant, or a spinal fluid leak?  Are you on long-term aspirin therapy?   No   Do you have cancer, leukemia, HIV/AIDS, or any other immune system problem?   No   Do you have a parent, brother, or sister with an immune system problem?   No   In the past 3 months, have you taken medications that affect  your immune system, such as prednisone, other steroids, or anticancer drugs; drugs for the treatment of rheumatoid arthritis, Crohn s disease, or psoriasis; or have you had radiation treatments?   No   Have you had a seizure, or a brain or other nervous system problem?   No   During the past year, have you received a transfusion of blood or blood    products, or been given immune (gamma) globulin or antiviral drug?   No   For women: Are you pregnant or is there a chance you could become       pregnant during the next month?   No   Have you received any vaccinations in the past 4 weeks?   No     Immunization questionnaire answers were all negative.      Patient instructed to remain in clinic for 15 minutes afterwards, and to report any adverse reactions.     Screening performed by To Au MA on 1/2/2024 at 1:32 PM.       Maranda Thrasher  /78   Pulse 80   Temp 98.6  F (37  C)   Resp 18   Ht 1.6 m (5' 3\")   Wt 98.7 kg (217 lb 8 oz)   SpO2 99%   BMI 38.53 kg/m       Assessment/Plan:                Maranda was seen today for " recheck medication and physical.    Diagnoses and all orders for this visit:    Type 2 diabetes mellitus without complication, without long-term current use of insulin (H)  -     Cancel: Hemoglobin A1c  -     Comprehensive metabolic panel (BMP + Alb, Alk Phos, ALT, AST, Total. Bili, TP)  -     Hemoglobin A1c  -     Lipid panel reflex to direct LDL Fasting    Dermatosis of perineum  -     nystatin (MYCOSTATIN) 939868 UNIT/GM external cream; Apply topically 2 times daily    Urinary incontinence, unspecified type    Hyperlipidemia, unspecified hyperlipidemia type    Essential hypertension    Other orders  -     ZOSTER RECOMBINANT ADJUVANTED (SHINGRIX)  -     RSV VACCINE (ABRYSVO)         DISCUSSION  See discussion below.  Check additional labs as noted above.  No medication changes today.  Update immunizations as noted.    Apply barrier cream consistently to protect perineal skin if his condition worsens need to notify me for further evaluation and treatment considerations.  In addition we will also prescribe nystatin cream.  Subjective:     HPI:    Maranda Thrasher is a 63 year old female  has type 2 diabetes and needs follow-up.  She is on oral medications.  She prefers to be very conservative about taking medications.    A1c is increased.  Patient drinking more soda and eating more desserts recently.  Discussed considerations regarding increasing medication patient declines.  Formulate a definitive plan to cut out soda and increase physical activity to improve A1c which is currently at 7.9.    No history of retinopathy [facility].  Reported exam last April 2023.  No history of kidney disease she is on lisinopril, blood pressure is controlled.  No history of neuropathy foot exam performed at last visit no reported changes or concerns since that time.  She is on appropriate medications for risk reduction with recently starting rosuvastatin, she reports no issues or  concerns.    Reviewed other health history including history of stroke stemming from vasculitis in the past which has been previously discussed.    Addressed routine health preventive measures, patient agreeable to vaccines but declines breast cancer screening and colon cancer screening at this time.    She does report longtime urinary incontinence which has led to some perineal irritation.  She declines examination today.  Her  has seen the area and reports it is improved with the application of an over-the-counter barrier cream.  Discussed potential referral for addressing urinary incontinence elected to hold off any specific evaluation at this time.  Patient will continue to use barrier cream and undergarment pads.  There is also some consideration of possible yeast dermatitis in the area elected to prescribe nystatin.  She agrees to return if there are persistent concerns    ROS:  Complete review of systems is obtained.  Other than the specific considerations noted above complete review of systems is negative.    Objective:   Medications:  Current Outpatient Medications   Medication    aspirin 81 MG EC tablet    blood glucose test (ACCU-CHEK CIARA PLUS TEST STRP) strips    blood-glucose meter Misc    generic lancets (FINGERSTIX LANCETS)    glipiZIDE (GLUCOTROL XL) 10 MG 24 hr tablet    latanoprost (XALATAN) 0.005 % ophthalmic solution    levETIRAcetam (KEPPRA) 500 MG tablet    lisinopril (ZESTRIL) 2.5 MG tablet    nystatin (MYCOSTATIN) 956227 UNIT/GM external cream    pioglitazone (ACTOS) 45 MG tablet    rosuvastatin (CRESTOR) 10 MG tablet    triamcinolone (KENALOG) 0.1 % cream     No current facility-administered medications for this visit.        Allergies:   Allergies   Allergen Reactions    Penicillins Unknown        Social History     Socioeconomic History    Marital status:      Spouse name: Not on file    Number of children: Not on file    Years of education: Not on file    Highest education  level: Not on file   Occupational History    Not on file   Tobacco Use    Smoking status: Never    Smokeless tobacco: Never   Substance and Sexual Activity    Alcohol use: Not on file    Drug use: Not on file    Sexual activity: Not on file   Other Topics Concern    Not on file   Social History Narrative    Not on file     Social Determinants of Health     Financial Resource Strain: Low Risk  (12/26/2023)    Financial Resource Strain     Within the past 12 months, have you or your family members you live with been unable to get utilities (heat, electricity) when it was really needed?: No   Food Insecurity: Low Risk  (12/26/2023)    Food Insecurity     Within the past 12 months, did you worry that your food would run out before you got money to buy more?: No     Within the past 12 months, did the food you bought just not last and you didn t have money to get more?: No   Transportation Needs: Low Risk  (12/26/2023)    Transportation Needs     Within the past 12 months, has lack of transportation kept you from medical appointments, getting your medicines, non-medical meetings or appointments, work, or from getting things that you need?: No   Physical Activity: Not on file   Stress: Not on file   Social Connections: Not on file   Interpersonal Safety: Low Risk  (1/2/2024)    Interpersonal Safety     Do you feel physically and emotionally safe where you currently live?: Yes     Within the past 12 months, have you been hit, slapped, kicked or otherwise physically hurt by someone?: No     Within the past 12 months, have you been humiliated or emotionally abused in other ways by your partner or ex-partner?: No   Housing Stability: Low Risk  (12/26/2023)    Housing Stability     Do you have housing? : Yes     Are you worried about losing your housing?: No       No family history on file.     Most Recent Immunizations   Administered Date(s) Administered    COVID-19 12+ (2023-24) (Pfizer) 09/30/2023    COVID-19 Bivalent 18+  "(Moderna) 10/08/2022    COVID-19 Monovalent 18+ (Moderna) 04/09/2022    Flu, Unspecified 10/13/2018    Influenza Vaccine 18-64 (Flublok) 10/08/2022    Influenza Vaccine >6 months,quad, PF 10/02/2021    Influenza Vaccine, 6+MO IM (QUADRIVALENT W/PRESERVATIVES) 09/25/2020    Influenza, seasonal, injectable, PF 10/06/2004    Influenza,INJ,MDCK,PF,Quad >6mo(Flucelvax) 09/30/2023    Pneumococcal 23 valent 02/04/2022    RSV Vaccine (Abrysvo) 01/02/2024    TDAP (Adacel,Boostrix) 03/11/2019    Zoster recombinant adjuvanted (SHINGRIX) 01/02/2024        Wt Readings from Last 3 Encounters:   01/02/24 98.7 kg (217 lb 8 oz)   07/11/23 97.1 kg (214 lb)   02/04/22 81.6 kg (180 lb)        BP Readings from Last 6 Encounters:   01/02/24 134/78   07/11/23 138/76   02/04/22 134/76   03/12/21 128/76   09/23/19 (!) 132/90        Hemoglobin A1C   Date Value Ref Range Status   01/02/2024 7.9 (H) 0.0 - 5.6 % Final     Comment:     Normal <5.7%   Prediabetes 5.7-6.4%    Diabetes 6.5% or higher     Note: Adopted from ADA consensus guidelines.   07/11/2023 7.6 (H) 0.0 - 5.6 % Final     Comment:     Normal <5.7%   Prediabetes 5.7-6.4%    Diabetes 6.5% or higher     Note: Adopted from ADA consensus guidelines.   05/02/2022 8.6 (H) 0.0 - 5.6 % Final     Comment:     Normal <5.7%   Prediabetes 5.7-6.4%    Diabetes 6.5% or higher     Note: Adopted from ADA consensus guidelines.              PHYSICAL EXAM:    /78   Pulse 80   Temp 98.6  F (37  C)   Resp 18   Ht 1.6 m (5' 3\")   Wt 98.7 kg (217 lb 8 oz)   SpO2 99%   BMI 38.53 kg/m           General Appearance:    Alert, cooperative, no distress   Eyes:   No scleral icterus or conjunctival irritation       Lungs:     Clear to auscultation bilaterally, respirations unlabored, no wheezes or crackles   Heart:    Regular rate and rhythm,  No murmur   Extremities:  No edema, no joint swelling or redness, no evidence of any injuries   Skin:  No concerning skin findings, no suspicious moles, no " tate   Neurologic:  On gross examination there is no motor or sensory deficit.  Patient walks with a normal gait       Answers submitted by the patient for this visit:  Lipid Visit (Submitted on 12/26/2023)  Chief Complaint: Chronic problems general questions HPI Form  Are you regularly taking any medication or supplement to lower your cholesterol?: Yes  Are you having muscle aches or other side effects that you think could be caused by your cholesterol lowering medication?: No  Diabetes Visit (Submitted on 12/26/2023)  Chief Complaint: Chronic problems general questions HPI Form  Frequency of checking blood sugars:: not at all  Diabetic concerns:: none  Paraesthesia present:: weight gain  Have you had a diabetic eye exam within the last year?: Yes  Date of last eye exam: : June 2013  Where was this eye exam done?: [facility]  [place]  General Questionnaire (Submitted on 12/26/2023)  Chief Complaint: Chronic problems general questions HPI Form  How many servings of fruits and vegetables do you eat daily?: 0-1  On average, how many sweetened beverages do you drink each day (Examples: soda, juice, sweet tea, etc.  Do NOT count diet or artificially sweetened beverages)?: 2  How many minutes a day do you exercise enough to make your heart beat faster?: 9 or less  How many days a week do you exercise enough to make your heart beat faster?: 3 or less  How many days per week do you miss taking your medication?: 0

## 2024-02-13 ENCOUNTER — TRANSFERRED RECORDS (OUTPATIENT)
Dept: HEALTH INFORMATION MANAGEMENT | Facility: CLINIC | Age: 64
End: 2024-02-13
Payer: COMMERCIAL

## 2024-02-21 ENCOUNTER — LAB (OUTPATIENT)
Dept: LAB | Facility: CLINIC | Age: 64
End: 2024-02-21
Payer: COMMERCIAL

## 2024-02-21 DIAGNOSIS — G40.209 SEIZURE DISORDER, COMPLEX PARTIAL (H): ICD-10-CM

## 2024-02-21 DIAGNOSIS — I77.6 VASCULITIS (H): ICD-10-CM

## 2024-02-21 DIAGNOSIS — I67.89 ISCHEMIC ENCEPHALOPATHY: Primary | ICD-10-CM

## 2024-02-21 DIAGNOSIS — I67.89 ISCHEMIC ENCEPHALOPATHY: ICD-10-CM

## 2024-02-21 LAB
BUN SERPL-MCNC: 15.7 MG/DL (ref 8–23)
CREAT SERPL-MCNC: 0.84 MG/DL (ref 0.51–0.95)
EGFRCR SERPLBLD CKD-EPI 2021: 78 ML/MIN/1.73M2

## 2024-02-21 PROCEDURE — 36415 COLL VENOUS BLD VENIPUNCTURE: CPT

## 2024-02-21 PROCEDURE — 80177 DRUG SCRN QUAN LEVETIRACETAM: CPT

## 2024-02-21 PROCEDURE — 84520 ASSAY OF UREA NITROGEN: CPT

## 2024-02-21 PROCEDURE — 82565 ASSAY OF CREATININE: CPT

## 2024-02-22 LAB — LEVETIRACETAM SERPL-MCNC: 15.9 ΜG/ML (ref 10–40)

## 2024-03-13 DIAGNOSIS — E11.9 TYPE 2 DIABETES MELLITUS WITHOUT COMPLICATION, WITHOUT LONG-TERM CURRENT USE OF INSULIN (H): ICD-10-CM

## 2024-03-13 RX ORDER — GLIPIZIDE 10 MG/1
10 TABLET, FILM COATED, EXTENDED RELEASE ORAL DAILY
Qty: 90 TABLET | Refills: 0 | Status: SHIPPED | OUTPATIENT
Start: 2024-03-13 | End: 2024-05-20

## 2024-03-25 DIAGNOSIS — E11.9 TYPE 2 DIABETES MELLITUS WITHOUT COMPLICATION, WITHOUT LONG-TERM CURRENT USE OF INSULIN (H): ICD-10-CM

## 2024-03-25 RX ORDER — PIOGLITAZONEHYDROCHLORIDE 45 MG/1
45 TABLET ORAL DAILY
Qty: 90 TABLET | Refills: 0 | Status: SHIPPED | OUTPATIENT
Start: 2024-03-25 | End: 2024-05-02 | Stop reason: SINTOL

## 2024-04-10 ENCOUNTER — TRANSFERRED RECORDS (OUTPATIENT)
Dept: MULTI SPECIALTY CLINIC | Facility: CLINIC | Age: 64
End: 2024-04-10

## 2024-04-10 LAB — RETINOPATHY: NORMAL

## 2024-04-23 ENCOUNTER — NURSE TRIAGE (OUTPATIENT)
Dept: NURSING | Facility: CLINIC | Age: 64
End: 2024-04-23
Payer: COMMERCIAL

## 2024-04-23 ENCOUNTER — HOSPITAL ENCOUNTER (EMERGENCY)
Facility: CLINIC | Age: 64
Discharge: HOME | End: 2024-04-23
Attending: EMERGENCY MEDICINE | Admitting: EMERGENCY MEDICINE
Payer: COMMERCIAL

## 2024-04-23 VITALS
RESPIRATION RATE: 18 BRPM | DIASTOLIC BLOOD PRESSURE: 81 MMHG | BODY MASS INDEX: 40.97 KG/M2 | TEMPERATURE: 97.5 F | OXYGEN SATURATION: 97 % | HEART RATE: 80 BPM | SYSTOLIC BLOOD PRESSURE: 160 MMHG | WEIGHT: 217 LBS | HEIGHT: 61 IN

## 2024-04-23 DIAGNOSIS — R82.998 URINE WBC INCREASED: ICD-10-CM

## 2024-04-23 DIAGNOSIS — M79.89 BILATERAL HAND SWELLING: ICD-10-CM

## 2024-04-23 DIAGNOSIS — M79.89 SWELLING OF LOWER EXTREMITY: ICD-10-CM

## 2024-04-23 DIAGNOSIS — R82.998 URINE LEUKOCYTES INCREASED: ICD-10-CM

## 2024-04-23 LAB
ALBUMIN SERPL BCG-MCNC: 4.1 G/DL (ref 3.5–5.2)
ALBUMIN UR-MCNC: NEGATIVE MG/DL
ALP SERPL-CCNC: 59 U/L (ref 40–150)
ALT SERPL W P-5'-P-CCNC: 20 U/L (ref 0–50)
ANION GAP SERPL CALCULATED.3IONS-SCNC: 12 MMOL/L (ref 7–15)
APPEARANCE UR: ABNORMAL
AST SERPL W P-5'-P-CCNC: 19 U/L (ref 0–45)
BACTERIA #/AREA URNS HPF: ABNORMAL /HPF
BASOPHILS # BLD AUTO: 0 10E3/UL (ref 0–0.2)
BASOPHILS NFR BLD AUTO: 1 %
BILIRUB SERPL-MCNC: 0.5 MG/DL
BILIRUB UR QL STRIP: NEGATIVE
BUN SERPL-MCNC: 16.4 MG/DL (ref 8–23)
CALCIUM SERPL-MCNC: 9.7 MG/DL (ref 8.8–10.2)
CHLORIDE SERPL-SCNC: 101 MMOL/L (ref 98–107)
CK SERPL-CCNC: 53 U/L (ref 26–192)
COLOR UR AUTO: YELLOW
CREAT SERPL-MCNC: 0.74 MG/DL (ref 0.51–0.95)
DEPRECATED HCO3 PLAS-SCNC: 26 MMOL/L (ref 22–29)
EGFRCR SERPLBLD CKD-EPI 2021: 90 ML/MIN/1.73M2
EOSINOPHIL # BLD AUTO: 0.4 10E3/UL (ref 0–0.7)
EOSINOPHIL NFR BLD AUTO: 5 %
ERYTHROCYTE [DISTWIDTH] IN BLOOD BY AUTOMATED COUNT: 13.8 % (ref 10–15)
GLUCOSE SERPL-MCNC: 153 MG/DL (ref 70–99)
GLUCOSE UR STRIP-MCNC: NEGATIVE MG/DL
HCT VFR BLD AUTO: 40.3 % (ref 35–47)
HGB BLD-MCNC: 13.1 G/DL (ref 11.7–15.7)
HGB UR QL STRIP: NEGATIVE
IMM GRANULOCYTES # BLD: 0 10E3/UL
IMM GRANULOCYTES NFR BLD: 1 %
KETONES UR STRIP-MCNC: NEGATIVE MG/DL
LEUKOCYTE ESTERASE UR QL STRIP: ABNORMAL
LYMPHOCYTES # BLD AUTO: 2.6 10E3/UL (ref 0.8–5.3)
LYMPHOCYTES NFR BLD AUTO: 31 %
MCH RBC QN AUTO: 29.6 PG (ref 26.5–33)
MCHC RBC AUTO-ENTMCNC: 32.5 G/DL (ref 31.5–36.5)
MCV RBC AUTO: 91 FL (ref 78–100)
MONOCYTES # BLD AUTO: 1.2 10E3/UL (ref 0–1.3)
MONOCYTES NFR BLD AUTO: 14 %
NEUTROPHILS # BLD AUTO: 4.2 10E3/UL (ref 1.6–8.3)
NEUTROPHILS NFR BLD AUTO: 50 %
NITRATE UR QL: NEGATIVE
NRBC # BLD AUTO: 0 10E3/UL
NRBC BLD AUTO-RTO: 0 /100
PH UR STRIP: 6 [PH] (ref 5–7)
PLATELET # BLD AUTO: 247 10E3/UL (ref 150–450)
POTASSIUM SERPL-SCNC: 4 MMOL/L (ref 3.4–5.3)
PROT SERPL-MCNC: 7.5 G/DL (ref 6.4–8.3)
RBC # BLD AUTO: 4.42 10E6/UL (ref 3.8–5.2)
RBC URINE: 2 /HPF
SODIUM SERPL-SCNC: 139 MMOL/L (ref 135–145)
SP GR UR STRIP: 1.01 (ref 1–1.03)
SQUAMOUS EPITHELIAL: 1 /HPF
UROBILINOGEN UR STRIP-MCNC: NORMAL MG/DL
WBC # BLD AUTO: 8.5 10E3/UL (ref 4–11)
WBC URINE: 7 /HPF

## 2024-04-23 PROCEDURE — 87086 URINE CULTURE/COLONY COUNT: CPT | Performed by: NURSE PRACTITIONER

## 2024-04-23 PROCEDURE — 99283 EMERGENCY DEPT VISIT LOW MDM: CPT | Performed by: NURSE PRACTITIONER

## 2024-04-23 PROCEDURE — 36415 COLL VENOUS BLD VENIPUNCTURE: CPT | Performed by: NURSE PRACTITIONER

## 2024-04-23 PROCEDURE — 81001 URINALYSIS AUTO W/SCOPE: CPT | Performed by: NURSE PRACTITIONER

## 2024-04-23 PROCEDURE — 99284 EMERGENCY DEPT VISIT MOD MDM: CPT | Performed by: NURSE PRACTITIONER

## 2024-04-23 PROCEDURE — 82550 ASSAY OF CK (CPK): CPT | Performed by: NURSE PRACTITIONER

## 2024-04-23 PROCEDURE — 87186 SC STD MICRODIL/AGAR DIL: CPT | Performed by: NURSE PRACTITIONER

## 2024-04-23 PROCEDURE — 85025 COMPLETE CBC W/AUTO DIFF WBC: CPT | Performed by: NURSE PRACTITIONER

## 2024-04-23 PROCEDURE — 80053 COMPREHEN METABOLIC PANEL: CPT | Performed by: NURSE PRACTITIONER

## 2024-04-23 ASSESSMENT — ACTIVITIES OF DAILY LIVING (ADL): ADLS_ACUITY_SCORE: 33

## 2024-04-23 ASSESSMENT — COLUMBIA-SUICIDE SEVERITY RATING SCALE - C-SSRS
6. HAVE YOU EVER DONE ANYTHING, STARTED TO DO ANYTHING, OR PREPARED TO DO ANYTHING TO END YOUR LIFE?: NO
1. IN THE PAST MONTH, HAVE YOU WISHED YOU WERE DEAD OR WISHED YOU COULD GO TO SLEEP AND NOT WAKE UP?: NO
2. HAVE YOU ACTUALLY HAD ANY THOUGHTS OF KILLING YOURSELF IN THE PAST MONTH?: NO

## 2024-04-23 NOTE — ED PROVIDER NOTES
There were no vitals taken for this visit.    Maranda Thrasher is a 63-year-old female with PMH X significant for type II DM, stroke, hyperlipidemia, HTN presenting with complaints of swelling in hands and feet, left leg pain, frequent urination.  Patient is concerned her new diabetes medication may be causing the symptoms.  Given patient's history, I recommended he/she be evaluated in the emergency department.  We discussed that he/she will likely require further testing and/or treatment beyond the capabilities of the current urgent care setting including labs and potential imaging.  Patient expresses understanding of this and agreement with the plan.          Jimena Chen PA-C  04/23/24 [id number]

## 2024-04-23 NOTE — ED TRIAGE NOTES
Pt presents with swelling to hands and feet, weight gain, back pain that radiates down left leg, and urinary frequency. Pt is on Pioglitazone for her diabetes and had a dose increase in October 2023. Pt and  think these symptoms may be related to the dose change. Pt contacted her doctor and was told to come to ED or urgent care right away.      Triage Assessment (Adult)       Row Name 04/23/24 2922          Triage Assessment    Airway WDL WDL        Respiratory WDL    Respiratory WDL WDL        Skin Circulation/Temperature WDL    Skin Circulation/Temperature WDL WDL        Cardiac WDL    Cardiac WDL WDL        Peripheral/Neurovascular WDL    Peripheral Neurovascular WDL WDL        Cognitive/Neuro/Behavioral WDL    Cognitive/Neuro/Behavioral WDL WDL

## 2024-04-23 NOTE — TELEPHONE ENCOUNTER
"Medication dosage increase: gaining a lot of weight, swelling in arms, hands, and legs. Diarrhea, has not been sleeping well, urinating a lot. Pain=\"5-8\"/10.   calling.   -------  Seen by Dr Barton and medication given: Pioglitazone for diabetes increasing the amount. Complaining of joint pain, urinating 3-5 times per night to urinate, pain in her side and back, diarrhea once a week, fingers and legs-feet are swollen. Rings needed to be removed. Urinating frequently during the day time. This has been going on for a couple of months.   Last seen by Dr Barton in January.  looked up the side effects of the medication and he feels she has all the side effects of this medication. She had been taking Ibuprofen 400mg every 4 hrs. It relieved some of the pain. Now she is taking OTC Aleve 1 every 12 hrs and it helps.     Triaged to a disposition of see in office today, however,  is currently at work. He intends to bring her to [facility] after work this evening. (Or to ER if  has closed).     Kristi Sheehan RN Triage Nurse Advisor 3:43 PM 4/23/2024  Reason for Disposition   Swelling of face, arm or hands  (Exception: Slight puffiness of fingers occurring during hot weather.)   Side (flank) or lower back pain present    Additional Information   Negative: SEVERE difficulty breathing (e.g., struggling for each breath, speaks in single words)   Negative: Looks like a broken bone or dislocated joint (e.g., crooked or deformed)   Negative: Sounds like a life-threatening emergency to the triager   Negative: Chest pain   Negative: Followed a leg injury   Negative: [1] Followed an insect bite AND [2] localized swelling (e.g., small area of puffy or swollen skin)   Negative: Swelling of one ankle joint   Negative: Swelling of knee is main symptom   Negative: Pregnant   Negative: Postpartum (from 0 to 6 weeks after delivery)   Negative: [1] Heart failure suspected (e.g., ankle swelling, shortness of breath, weight " gain) AND [2] recently in hospital for heart failure   Negative: Difficulty breathing at rest   Negative: Entire foot is cool or blue in comparison to other side   Negative: [1] Can't walk or can barely walk AND [2] new-onset   Negative: [1] Difficulty breathing with exertion (e.g., walking) AND [2] new-onset or worsening   Negative: [1] Red area or streak AND [2] fever   Negative: [1] Swelling is painful to touch AND [2] fever   Negative: [1] Cast on leg or ankle AND [2] now increased pain   Negative: Patient sounds very sick or weak to the triager   Negative: SEVERE leg swelling (e.g., swelling extends above knee, entire leg is swollen, weeping fluid)   Negative: [1] Red area or streak [2] large (> 2 in. or 5 cm)   Negative: [1] Thigh or calf pain AND [2] only 1 side AND [3] present > 1 hour   Negative: [1] Thigh, calf, or ankle swelling AND [2] only 1 side   Negative: [1] Thigh, calf, or ankle swelling AND [2] bilateral AND [3] 1 side is more swollen   Negative: [1] MODERATE leg swelling (e.g., swelling extends up to knees) AND [2] new-onset or worsening   Negative: Shock suspected (e.g., cold/pale/clammy skin, too weak to stand, low BP, rapid pulse)   Negative: Sounds like a life-threatening emergency to the triager   Negative: Followed a female genital area injury (e.g., labia, vagina, vulva)   Negative: Followed a male genital area injury (penis, scrotum)   Negative: Vaginal discharge   Negative: Pus (white, yellow) or bloody discharge from end of penis   Negative: Pain or burning with passing urine (urination) and pregnant   Negative: Pain or burning with passing urine (urination) and female   Negative: Pain or burning with passing urine (urination) and male   Negative: Pain or itching in the vulvar area   Negative: Pain in scrotum is main symptom   Negative: Blood in the urine is main symptom   Negative: Symptoms arising from use of a urinary catheter (e.g., Coude, Drummond)   Negative: Unable to urinate (or  only a few drops) > 4 hours and bladder feels very full (e.g., palpable bladder or strong urge to urinate)   Negative: Decreased urination and drinking very little and dehydration suspected (e.g., dark urine, no urine > 12 hours, very dry mouth, very lightheaded)   Negative: Patient sounds very sick or weak to the triager   Negative: Fever > 100.4 F  (38.0 C)    Protocols used: Leg Swelling and Edema-A-AH, Urinary Symptoms-A-OH

## 2024-04-24 ENCOUNTER — TELEPHONE (OUTPATIENT)
Dept: UROLOGY | Facility: CLINIC | Age: 64
End: 2024-04-24
Payer: COMMERCIAL

## 2024-04-24 NOTE — TELEPHONE ENCOUNTER
[facility] Call Center    Phone Message    May a detailed message be left on voicemail: yes     Reason for Call: Appointment Intake    Referring Provider Name: Araceli King APRN CNP  Diagnosis and/or Symptoms: Urine leukocytes increased [R82.998] Urine WBC increased [R82.998]     Dx was not listed in protocols. Priority line could not be reached please call Anton to schedule.    Action Taken: Other: [place] - Urology    Travel Screening: Not Applicable

## 2024-04-24 NOTE — TELEPHONE ENCOUNTER
Please al pt and make appt with urology (agnieszka) within the next month. Pt establishing care. Thank you.  Yen PALMER RN BSN PHN  [facility]

## 2024-04-24 NOTE — DISCHARGE INSTRUCTIONS
Today you are seen in the emergency room for your extremity swelling and your increased urination.  The urine is concern for possible urinary tract infection.  We discussed this and I offered antibiotics.  It was decided that you would like to wait for the urine culture to determine if antibiotics are necessary.  This seemed reasonable.  The concern today was possible reaction to medication pioglitazone.  We assessed for possible liver and kidney damage and did not find any reason to think that that is present.  We assessed creatinine kinase to check your muscles and this was normal as well.  It is still possible that the swelling that you are experiencing is related to the pioglitazone.  Due to convenience and location it was offered to establish primary care and urology care here at [facility].  I  placed orders for this.  You should receive a phone call within a few days to establish primary care referral and you should receive a phone call within the next week to establish care with urology.  Please return to the emergency room as needed.  We also discussed the importance of drinking at least 64 fluid ounces of water  daily.  We discussed the importance of exercise and consideration for aqua therapy.

## 2024-04-24 NOTE — ED PROVIDER NOTES
History     Chief Complaint   Patient presents with    Leg Swelling    Leg Pain    Back Pain    Urinary Frequency     HPI  Maranda Thrasher is a 63 year old female with past medical history of cerebrovascular accident, vasculitis, hyperlipidemia, seizure disorder, type 2 diabetes, memory loss, hypertension, depression, anxiety who presents emergency department with chronic concerns of urinary frequency, upper extremity and lower extremity swelling, weight gain, back and leg pain.  Patient reports they have reviewed the potential side effects of the medication pioglitazone.  They report that since increasing the dose last fall they have noted swelling in the hands and feet that his gradually worsened with increased urinary frequency, and reports ongoing back and leg pain.  Patient's  provides primary HPI as patient has expressive aphasia secondary to previous strokes.  Patient is able to speak and is alert and orientated.  They report they called today to try to get in with her doctor to discuss this with the doctor and they were advised to come to the emergency room immediately.  They deny any recent surgery, immobilization, history of blood clot in the lungs or legs, long car rides or plane rides.  They deny any other medication changes.    Patient denies any fever, aches, chills, sweats, chest pain, cough, shortness of breath, difficulty breathing, nausea.    Allergies:  Allergies   Allergen Reactions    Penicillins Unknown       Problem List:    Patient Active Problem List    Diagnosis Date Noted    Diabetes mellitus, type 2 (H) 05/29/2017     Priority: Medium    CVA (cerebral vascular accident) (H) 05/29/2017     Priority: Medium    Vasculitis (H24) 05/29/2017     Priority: Medium    Hyperlipidemia 05/29/2017     Priority: Medium    Seizure disorder (H) 05/29/2017     Priority: Medium    Tachycardia, unspecified 02/09/2011     Priority: Medium     Tachycardia        Essential hypertension 02/09/2011      "Priority: Medium     Hypertension        Pure hypercholesterolemia 08/04/2010     Priority: Medium     Hypercholesterolemia        Orthostatic hypotension 07/21/2010     Priority: Medium     Orthostatic Hypotension        Urinary tract infection, site not specified 07/14/2010     Priority: Medium     Urinary Tract Infection        Memory loss 07/14/2010     Priority: Medium     Memory Lapses Or Loss        Dermatophytosis 04/25/2009     Priority: Medium     Dermatophytosis        Primary hypercoagulable state (H24) 04/24/2009     Priority: Medium     Prothrombin Gene Mutation        Depressive disorder 04/23/2009     Priority: Medium     Depression        Anxiety state 04/23/2009     Priority: Medium     Anxiety Disorder NOS            Past Medical History:    No past medical history on file.    Past Surgical History:    No past surgical history on file.    Family History:    No family history on file.    Social History:  Marital Status:   [2]  Social History     Tobacco Use    Smoking status: Never    Smokeless tobacco: Never        Medications:    aspirin 81 MG EC tablet  blood glucose test (ACCU-CHEK CIARA PLUS TEST STRP) strips  blood-glucose meter Misc  generic lancets (FINGERSTIX LANCETS)  glipiZIDE (GLUCOTROL XL) 10 MG 24 hr tablet  latanoprost (XALATAN) 0.005 % ophthalmic solution  levETIRAcetam (KEPPRA) 500 MG tablet  lisinopril (ZESTRIL) 2.5 MG tablet  nystatin (MYCOSTATIN) 509560 UNIT/GM external cream  pioglitazone (ACTOS) 45 MG tablet  rosuvastatin (CRESTOR) 10 MG tablet  triamcinolone (KENALOG) 0.1 % cream          Review of Systems  As mentioned above in the history present illness. All other systems were reviewed and are negative.    Physical Exam   BP: (!) 159/88  Pulse: 80  Temp: 97.5  F (36.4  C)  Resp: 18  Height: 154.9 cm (5' 1\")  Weight: 98.4 kg (217 lb)  SpO2: 99 %      Physical Exam  Vitals and nursing note reviewed.   Constitutional:       General: She is not in acute distress.     " Appearance: Normal appearance. She is well-developed. She is obese. She is not ill-appearing, toxic-appearing or diaphoretic.   HENT:      Head: Normocephalic and atraumatic.      Right Ear: External ear normal.      Left Ear: External ear normal.   Eyes:      General:         Right eye: No discharge.         Left eye: No discharge.      Conjunctiva/sclera: Conjunctivae normal.   Cardiovascular:      Rate and Rhythm: Normal rate and regular rhythm.      Heart sounds: Normal heart sounds. No murmur heard.     No friction rub.   Pulmonary:      Effort: Pulmonary effort is normal. No respiratory distress.      Breath sounds: Normal breath sounds. No stridor. No wheezing, rhonchi or rales.   Musculoskeletal:      Right lower leg: Edema (trace bilateral pedal) present.      Left lower leg: Edema (trace bilateral pedal) present.   Skin:     General: Skin is warm and dry.      Coloration: Skin is not pale.      Findings: No erythema or rash.   Neurological:      Mental Status: She is alert.         ED Course     ED Course as of 04/23/24 2025 Tue Apr 23, 2024 1948 UA with Microscopic reflex to Culture(!)  UA is slightly cloudy with small leukocyte Estrace, moderate bacteria and 7 white blood cells.  Given the polyuria and diabetic status we will encourage antibiotic therapy and culture the urine.  For a simple cystitis   1949 Comprehensive metabolic panel(!)  Comprehensive metabolic panel reveals glucose of 153 but otherwise unremarkable.   1949 CK total  Creatinine kinase is 53 and normal   1949 CBC with platelets differential  CBC is unremarkable.     Procedures    Results for orders placed or performed during the hospital encounter of 04/23/24 (from the past 24 hour(s))   CBC with platelets differential    Narrative    The following orders were created for panel order CBC with platelets differential.  Procedure                               Abnormality         Status                     ---------                                -----------         ------                     CBC with platelets and d...[[id number]]                      Final result                 Please view results for these tests on the individual orders.   Comprehensive metabolic panel   Result Value Ref Range    Sodium 139 135 - 145 mmol/L    Potassium 4.0 3.4 - 5.3 mmol/L    Carbon Dioxide (CO2) 26 22 - 29 mmol/L    Anion Gap 12 7 - 15 mmol/L    Urea Nitrogen 16.4 8.0 - 23.0 mg/dL    Creatinine 0.74 0.51 - 0.95 mg/dL    GFR Estimate 90 >60 mL/min/1.73m2    Calcium 9.7 8.8 - 10.2 mg/dL    Chloride 101 98 - 107 mmol/L    Glucose 153 (H) 70 - 99 mg/dL    Alkaline Phosphatase 59 40 - 150 U/L    AST 19 0 - 45 U/L    ALT 20 0 - 50 U/L    Protein Total 7.5 6.4 - 8.3 g/dL    Albumin 4.1 3.5 - 5.2 g/dL    Bilirubin Total 0.5 <=1.2 mg/dL   CK total   Result Value Ref Range    CK 53 26 - 192 U/L   CBC with platelets and differential   Result Value Ref Range    WBC Count 8.5 4.0 - 11.0 10e3/uL    RBC Count 4.42 3.80 - 5.20 10e6/uL    Hemoglobin 13.1 11.7 - 15.7 g/dL    Hematocrit 40.3 35.0 - 47.0 %    MCV 91 78 - 100 fL    MCH 29.6 26.5 - 33.0 pg    MCHC 32.5 31.5 - 36.5 g/dL    RDW 13.8 10.0 - 15.0 %    Platelet Count 247 150 - 450 10e3/uL    % Neutrophils 50 %    % Lymphocytes 31 %    % Monocytes 14 %    % Eosinophils 5 %    % Basophils 1 %    % Immature Granulocytes 1 %    NRBCs per 100 WBC 0 <1 /100    Absolute Neutrophils 4.2 1.6 - 8.3 10e3/uL    Absolute Lymphocytes 2.6 0.8 - 5.3 10e3/uL    Absolute Monocytes 1.2 0.0 - 1.3 10e3/uL    Absolute Eosinophils 0.4 0.0 - 0.7 10e3/uL    Absolute Basophils 0.0 0.0 - 0.2 10e3/uL    Absolute Immature Granulocytes 0.0 <=0.4 10e3/uL    Absolute NRBCs 0.0 10e3/uL   UA with Microscopic reflex to Culture    Specimen: Urine, Midstream   Result Value Ref Range    Color Urine Yellow Colorless, Straw, Light Yellow, Yellow    Appearance Urine Slightly Cloudy (A) Clear    Glucose Urine Negative Negative mg/dL    Bilirubin Urine Negative  Negative    Ketones Urine Negative Negative mg/dL    Specific Gravity Urine 1.010 1.003 - 1.035    Blood Urine Negative Negative    pH Urine 6.0 5.0 - 7.0    Protein Albumin Urine Negative Negative mg/dL    Urobilinogen Urine Normal Normal, 2.0 mg/dL    Nitrite Urine Negative Negative    Leukocyte Esterase Urine Small (A) Negative    Bacteria Urine Moderate (A) None Seen /HPF    RBC Urine 2 <=2 /HPF    WBC Urine 7 (H) <=5 /HPF    Squamous Epithelials Urine 1 <=1 /HPF    Narrative    Urine Culture not indicated       Medications - No data to display    Assessments & Plan (with Medical Decision Making)     I have reviewed the nursing notes.    I have reviewed the findings, diagnosis, plan and need for follow up with the patient.    Wells DVT Score (calculator)  Background  Calculates probability of DVT based on 8 criteria including active cancer, immobility, localized vessel course pain, calf or leg swelling, or non-varicose superficial vein prominence.  Data  has Diabetes mellitus, type 2 (H); CVA (cerebral vascular accident) (H); Vasculitis (H24); Hyperlipidemia; Seizure disorder (H); Urinary tract infection, site not specified; Tachycardia, unspecified; Pure hypercholesterolemia; Primary hypercoagulable state (H24); Orthostatic hypotension; Memory loss; Essential hypertension; Dermatophytosis; Depressive disorder; and Anxiety state on their problem list.   has no past surgical history on file.  Criteria   One point for each of 8 possible items:  NEGATIVE  Subtract 2 points from above if if other diagnosis at least as likely as DVT  Interpretation  Wells DVT Score: 0  Low Probability (3% DVT probability): 0 Points    Maranda Thrasher is a 63 year old female with past medical history of cerebrovascular accident, vasculitis, hyperlipidemia, seizure disorder, type 2 diabetes, memory loss, hypertension, depression, anxiety who presents emergency department with chronic concerns of urinary frequency, upper extremity and  lower extremity swelling, weight gain, back and leg pain.  Reviewed with patient adverse reactions of the pioglitazone that they are concerned about.  Patient denying any chest pain presently, shortness of breath, orthopnea.  Will obtain a CBC, comprehensive metabolic panel creatinine kinase to rule out any hepatorenal failure oriented abnormal creatinine kinase noted secondary to medications.  Believe this is of low yield.  Also will obtain a urinalysis to rule out urinary tract infection.  Withdrawal unremarkable with the exception of a UA that is concerning for UTI.  Shared decision making completed and patient wishes to wait until urine culture comes back before initiating antibiotics.  This seems reasonable since patient is stating she has had urinary frequency since December.  Patient would like primary care referral and urology referral to again establish care here due to convenience and location.  Orders placed.  Patient discharged in stable condition.  Complete discussion also had the importance of exercise water intake and possible aqua therapy for her ongoing issues.  Patient verbalizes understanding and was discharged in stable condition.           Discharge Medication List as of 4/23/2024  8:02 PM          Final diagnoses:   Bilateral hand swelling   Swelling of lower extremity - bilateral   Urine leukocytes increased   Urine WBC increased       4/23/2024   [facility] EMERGENCY DEPT       Fernando, Araceli Pratt, DUC CNP  04/23/24 2025

## 2024-04-25 NOTE — TELEPHONE ENCOUNTER
Rebecca Manzo35 minutes ago (9:03 AM)     TP  Called her  and he declined our next available saying someone called him with something for next Thursday 5/2/24.

## 2024-04-27 LAB — BACTERIA UR CULT: ABNORMAL

## 2024-04-28 ENCOUNTER — TELEPHONE (OUTPATIENT)
Dept: EMERGENCY MEDICINE | Facility: CLINIC | Age: 64
End: 2024-04-28
Payer: COMMERCIAL

## 2024-04-28 RX ORDER — NITROFURANTOIN 25; 75 MG/1; MG/1
100 CAPSULE ORAL 2 TIMES DAILY
Qty: 10 CAPSULE | Refills: 0 | Status: SHIPPED | OUTPATIENT
Start: 2024-04-28 | End: 2024-05-03

## 2024-04-28 NOTE — TELEPHONE ENCOUNTER
[facility]    Reason for call: Lab Result Notification     Lab Result (including Rx patient on, if applicable).  If culture, copy of lab report at bottom.  Lab Result: See below    Creatinine Level (mg/dl)   Creatinine   Date Value Ref Range Status   04/23/2024 0.74 0.51 - 0.95 mg/dL Final    Creatinine clearance (ml/min), if applicable    Serum creatinine: 0.74 mg/dL 04/23/24 1921  Estimated creatinine clearance: 83.5 mL/min     Spoke with  - consent to communicate on file.   Patient's current Symptoms:   Same as it was at the ED visit. Still having urgency. Denies pain and fever. Has appt in clinic on Thursday.     RN Recommendations/Instructions per [place] ED lab result protocol:   [facility] ED lab result protocol utilized: Urine culture  Instruct to start antibiotic, sent to preferred pharmacy.   Reviewed information below, as well as recommending checking blood sugars more often.   Patient Education on preventing future UTI's.  Practice good personal hygiene. Wipe yourself from front to back after using the toilet. This helps keep bacteria from getting into the urethra. Keep the genital area clean and dry.  Drink plenty of fluids, such as water, juice, or other caffeine-free drinks.  Drink at least 6-8 glasses a day (1 1/2 to 2 1/2 liters), unless you must restrict fluids for other medical reasons. This will force the medicine (antibiotic) into your urinary system and flush the bacteria out of your body. Cranberry juice has been shown to help clear out the bacteria.  Empty your bladder. Always empty your bladder when you feel the urge to urinate. And always urinate before going to sleep. Urine that stays in your bladder can lead to infection. Try to urinate before and after sex as well.  Follow up with your health care provider as directed. He or she may test to make sure the infection has cleared. If necessary, additional treatment may be started.    Patient/care giver notified to contact your PCP clinic or return to the Emergency department if your:  Symptoms return.  Symptoms do not improve after 3 days on antibiotic.  Symptoms do not resolve after completing antibiotic.  Symptoms worsen or other concerning symptoms.     is agreeable with plan and verbalized understanding.    Kehinde Mcleod RN

## 2024-05-02 ENCOUNTER — OFFICE VISIT (OUTPATIENT)
Dept: FAMILY MEDICINE | Facility: CLINIC | Age: 64
End: 2024-05-02
Payer: COMMERCIAL

## 2024-05-02 VITALS
OXYGEN SATURATION: 98 % | TEMPERATURE: 97.8 F | BODY MASS INDEX: 39.46 KG/M2 | SYSTOLIC BLOOD PRESSURE: 130 MMHG | DIASTOLIC BLOOD PRESSURE: 72 MMHG | HEIGHT: 61 IN | HEART RATE: 87 BPM | WEIGHT: 209 LBS | RESPIRATION RATE: 20 BRPM

## 2024-05-02 DIAGNOSIS — E66.812 CLASS 2 SEVERE OBESITY DUE TO EXCESS CALORIES WITH SERIOUS COMORBIDITY AND BODY MASS INDEX (BMI) OF 39.0 TO 39.9 IN ADULT (H): ICD-10-CM

## 2024-05-02 DIAGNOSIS — E11.49 TYPE 2 DIABETES MELLITUS WITH OTHER NEUROLOGIC COMPLICATION, WITHOUT LONG-TERM CURRENT USE OF INSULIN (H): ICD-10-CM

## 2024-05-02 DIAGNOSIS — N39.0 URINARY TRACT INFECTION IN FEMALE: ICD-10-CM

## 2024-05-02 DIAGNOSIS — E66.01 CLASS 2 SEVERE OBESITY DUE TO EXCESS CALORIES WITH SERIOUS COMORBIDITY AND BODY MASS INDEX (BMI) OF 39.0 TO 39.9 IN ADULT (H): ICD-10-CM

## 2024-05-02 DIAGNOSIS — B37.31 VULVOVAGINAL CANDIDIASIS: ICD-10-CM

## 2024-05-02 DIAGNOSIS — T38.3X5S: Primary | ICD-10-CM

## 2024-05-02 LAB — HBA1C MFR BLD: 6.8 % (ref 0–5.6)

## 2024-05-02 PROCEDURE — 99214 OFFICE O/P EST MOD 30 MIN: CPT | Mod: 25 | Performed by: FAMILY MEDICINE

## 2024-05-02 PROCEDURE — 90677 PCV20 VACCINE IM: CPT | Performed by: FAMILY MEDICINE

## 2024-05-02 PROCEDURE — 90471 IMMUNIZATION ADMIN: CPT | Performed by: FAMILY MEDICINE

## 2024-05-02 PROCEDURE — 83036 HEMOGLOBIN GLYCOSYLATED A1C: CPT | Performed by: FAMILY MEDICINE

## 2024-05-02 PROCEDURE — 36415 COLL VENOUS BLD VENIPUNCTURE: CPT | Performed by: FAMILY MEDICINE

## 2024-05-02 RX ORDER — FLUCONAZOLE 150 MG/1
150 TABLET ORAL ONCE
Qty: 1 TABLET | Refills: 0 | Status: SHIPPED | OUTPATIENT
Start: 2024-05-02 | End: 2024-05-02

## 2024-05-02 ASSESSMENT — PAIN SCALES - GENERAL: PAINLEVEL: MODERATE PAIN (5)

## 2024-05-02 NOTE — NURSING NOTE
Prior to immunization administration, verified patients identity using patient s name and date of birth. Please see Immunization Activity for additional information.     Screening Questionnaire for Adult Immunization    Are you sick today?   No   Do you have allergies to medications, food, a vaccine component or latex?   Yes   Have you ever had a serious reaction after receiving a vaccination?   No   Do you have a long-term health problem with heart, lung, kidney, or metabolic disease (e.g., diabetes), asthma, a blood disorder, no spleen, complement component deficiency, a cochlear implant, or a spinal fluid leak?  Are you on long-term aspirin therapy?   No   Do you have cancer, leukemia, HIV/AIDS, or any other immune system problem?   No   Do you have a parent, brother, or sister with an immune system problem?   No   In the past 3 months, have you taken medications that affect  your immune system, such as prednisone, other steroids, or anticancer drugs; drugs for the treatment of rheumatoid arthritis, Crohn s disease, or psoriasis; or have you had radiation treatments?   No   Have you had a seizure, or a brain or other nervous system problem?   Yes   During the past year, have you received a transfusion of blood or blood    products, or been given immune (gamma) globulin or antiviral drug?   No   For women: Are you pregnant or is there a chance you could become       pregnant during the next month?   No   Have you received any vaccinations in the past 4 weeks?   No     Immunization questionnaire was positive for at least one answer.  Notified Dr. Islas.      Patient instructed to remain in clinic for 15 minutes afterwards, and to report any adverse reactions.     Screening performed by Danielle Denise MA on 5/2/2024 at 10:19 AM.

## 2024-05-02 NOTE — PATIENT INSTRUCTIONS
Discontinue nystatin.  Take diflucan for one day.  Expect vaginal yeast infection to clear within 7 days.  Contact provider again if not cleared up by next week.    You will be contacted in 24 hours for results of your lab tests.   Decision to start new med will depend on that.  Stop pioglitazone due to side effect.  Continue glipizide XL.    Be consistent with low salt, low trans fat and low saturated fat diet.  Eat food rich in omega-3-fatty acids as you tolerate. (salmon, olive oil)  Eat 5 cups of vegetables, fruits and whole grains per day.  Limit starchy food (white rice, white bread, white pasta, white potatoes) to less than a cup per meal.  Minimize sweets, junk food and fastfood. Limit soda beverages to one serving per day; best to avoid it altogether though.    Exercise as you are able to.    Reconsider completing pelvic exam to screen for cervical cancer.  Start completing other cancer screenings and other immunizations.

## 2024-05-02 NOTE — PROGRESS NOTES
Assessment & Plan     Adverse reaction to antidiabetic drug, sequela  Mainly non-pitting edema of the hands and legs.  Check A1c today. Patient may discontinue pioglitazone.  Decide on alternative med if needed based on lab.  - Hemoglobin A1c  - Hemoglobin A1c    Type 2 diabetes mellitus with other neurologic complication, without long-term current use of insulin (H)  Continue glipizide XL for now.  Consider januvia or jardiance (patient prefers oral rather than injectables); if starting either, will at least reduce dose of glipizide XL.  Patient preferred to get PCV20 today.  - Hemoglobin A1c  - Hemoglobin A1c    Vulvovaginal candidiasis  Refractory to nystatin topical. May stop this med.  Give foral fluconazole.  Recheck in a week if no improvement at all.  - fluconazole (DIFLUCAN) 150 MG tablet  Dispense: 1 tablet; Refill: 0    Urinary tract infection in female  Resolving. No acute signs today.  Continue nitrofurantoin. Completing course tomorrow.  Push oral fluids.  Return precautions discussed and given to patient.     Class 2 severe obesity due to excess calories with serious comorbidity and body mass index (BMI) of 39.0 to 39.9 in adult (H)  Increases complexity of management of the above conditions.  Weight could improve with discontinuation of pioglitazone.  Will still need likely better nutrition and encouragement on exercise (limited due to hx of stroke)      MED REC REQUIRED  Post Medication Reconciliation Status: discharge medications reconciled and changed, per note/orders      Patient Instructions   Discontinue nystatin.  Take diflucan for one day.  Expect vaginal yeast infection to clear within 7 days.  Contact provider again if not cleared up by next week.    You will be contacted in 24 hours for results of your lab tests.   Decision to start new med will depend on that.  Stop pioglitazone due to side effect.  Continue glipizide XL.    Be consistent with low salt, low trans fat and low saturated  fat diet.  Eat food rich in omega-3-fatty acids as you tolerate. (salmon, olive oil)  Eat 5 cups of vegetables, fruits and whole grains per day.  Limit starchy food (white rice, white bread, white pasta, white potatoes) to less than a cup per meal.  Minimize sweets, junk food and fastfood. Limit soda beverages to one serving per day; best to avoid it altogether though.    Exercise as you are able to.    Reconsider completing pelvic exam to screen for cervical cancer.  Start completing other cancer screenings and other immunizations.    Raina Low is a 63 year old, presenting for the following health issues:  ER F/U, Diabetes, and Recheck Medication        5/2/2024     9:24 AM   Additional Questions   Roomed by Danielle PORRAS MA   Accompanied by self         5/2/2024     9:24 AM   Patient Reported Additional Medications   Patient reports taking the following new medications none     HPI       ED/UC Followup:    Facility:  [facility] Emergency Dept  Date of visit: 4/23/24  Reason for visit: swelling, UTI  Current Status: Improved    Diabetes Follow-up    How often are you checking your blood sugar? Not at all  What concerns do you have today about your diabetes? None   Do you have any of these symptoms? (Select all that apply)  Redness, sores, or blisters on feet and Weight gain  Have you had a diabetic eye exam in the last 12 months? Yes- Date of last eye exam: 4/13/24,  Location: [facility]     Spouse speaks for patient due to patient hx of CVA causing aphasia and speech difficulties.    Concern about DM meds possibly causing swelling. On glipizide XL and pioglitazone.      BP Readings from Last 2 Encounters:   05/02/24 130/72   04/23/24 (!) 160/81     Hemoglobin A1C (%)   Date Value   01/02/2024 7.9 (H)   07/11/2023 7.6 (H)     LDL Cholesterol Calculated (mg/dL)   Date Value   01/02/2024 76   02/04/2022 162 (H)     Vaginal candidiasis since Jan 2024 not completely clearing with nystatin  "cream.  No other treatments.        Objective    /72   Pulse 87   Temp 97.8  F (36.6  C) (Tympanic)   Resp 20   Ht 1.549 m (5' 1\")   Wt 94.8 kg (209 lb)   SpO2 98%   BMI 39.49 kg/m    Body mass index is 39.49 kg/m .  Physical Exam   GENERAL: obese, alert and no distress, ambulatory w/o assist but needed assist in stepping up exam bed.  NECK: no tenderness, no adenopathy,  Thyroid not enlarged  RESP: lungs clear to auscultation - no rales, no rhonchi, no wheezes  CV: regular rates and rhythm, no murmur  MS: no edema  SKIN: no suspicious lesions, no rashes  NEURO: aphasic  ABD:  protuberant, nontender   BACK: no CVA tenderness either side.    Results for orders placed or performed in visit on 05/02/24   Hemoglobin A1c     Status: Abnormal   Result Value Ref Range    Hemoglobin A1C 6.8 (H) 0.0 - 5.6 %           Signed Electronically by: Yassine Islas MD    "

## 2024-05-19 ENCOUNTER — MYC MEDICAL ADVICE (OUTPATIENT)
Dept: FAMILY MEDICINE | Facility: CLINIC | Age: 64
End: 2024-05-19
Payer: COMMERCIAL

## 2024-05-19 DIAGNOSIS — E11.49 TYPE 2 DIABETES MELLITUS WITH OTHER NEUROLOGIC COMPLICATION, WITHOUT LONG-TERM CURRENT USE OF INSULIN (H): Primary | ICD-10-CM

## 2024-05-19 DIAGNOSIS — E11.9 TYPE 2 DIABETES MELLITUS WITHOUT COMPLICATION, WITHOUT LONG-TERM CURRENT USE OF INSULIN (H): ICD-10-CM

## 2024-05-20 ENCOUNTER — TELEPHONE (OUTPATIENT)
Dept: FAMILY MEDICINE | Facility: CLINIC | Age: 64
End: 2024-05-20
Payer: COMMERCIAL

## 2024-05-20 DIAGNOSIS — E11.49 TYPE 2 DIABETES MELLITUS WITH OTHER NEUROLOGIC COMPLICATION, WITHOUT LONG-TERM CURRENT USE OF INSULIN (H): ICD-10-CM

## 2024-05-20 RX ORDER — GLIPIZIDE 5 MG/1
5 TABLET, FILM COATED, EXTENDED RELEASE ORAL DAILY
Qty: 90 TABLET | Refills: 3 | Status: SHIPPED | OUTPATIENT
Start: 2024-05-20

## 2024-05-21 RX ORDER — EMPAGLIFLOZIN 10 MG/1
10 TABLET, FILM COATED ORAL DAILY
Qty: 90 TABLET | Refills: 1 | OUTPATIENT
Start: 2024-05-21

## 2024-05-21 NOTE — TELEPHONE ENCOUNTER
Note: Due to record-high volumes, our turn-around time is taking longer than usual . We are currently 2 weeks behind in the pools.   We are working diligently to submit all requests in a timely manner and in the order they are received. Please only flag TRUE URGENT requests as high priority to the pool at this time.   If you have questions on status of PA's,  please send a note/message in the active PA encounter and send back to the [facility] PA pool [[id number]].    If you have questions about the turn-around time or about our process, please reach out to our supervisor Danielle Bardales.   Thank you!   RPPA (Retail Pharmacy Prior Authorization) team

## 2024-05-29 ENCOUNTER — TELEPHONE (OUTPATIENT)
Dept: NURSING | Facility: CLINIC | Age: 64
End: 2024-05-29
Payer: COMMERCIAL

## 2024-05-29 DIAGNOSIS — E11.9 TYPE 2 DIABETES MELLITUS WITHOUT COMPLICATION, WITHOUT LONG-TERM CURRENT USE OF INSULIN (H): Primary | ICD-10-CM

## 2024-05-29 NOTE — TELEPHONE ENCOUNTER
"170-198 one week ago after eating  Nurse Triage SBAR    Is this a 2nd Level Triage? YES, LICENSED PRACTITIONER REVIEW IS REQUIRED    Situation: The  is calling and says the patient was prescribed Empagliflozin 10 mg daily and it has not been approved for prior authorization for the pharmacy to dispense the medication  He reports the insurance says the provider needs to seek prior authorization before sending the medication to the pharmacy    \"OBTAIN PA USING PA Phone # [phone] or TP Help Desk Phone:[phone] PRIOR AUTH NEEDED. MD TO CALL HELP DESK\"    Background: The patient was having adverse effects from the pioglitazone HCL 45 mg by mouth daily and it was discontinued on 05/02/24    Assessment: The  reports that the patient's symptoms have improved  The  reports the patient's blood glucose readings after meals were 170-198 one week ago, and he has not monitored the readings this week.   Protocol Recommended Disposition:   No disposition on file.    Recommendation: Continue with current treatment plan, wait for providers return call.      Routed to [facility] and [facility]    Does the patient meet one of the following criteria for ADS visit consideration? 16+ years old, with an [facility] PCP     TIP  Providers, please consider if this condition is appropriate for management at one of our Acute and Diagnostic Services sites.     If patient is a good candidate, please use dotphrase <dot>triageresponse and select Refer to ADS to document.    "

## 2024-05-29 NOTE — TELEPHONE ENCOUNTER
Dr Islas   I called the  to tell him our Prior Auth team says it will be almost 2 weeks before they can process the Prior Auth for the Jardiance.   wants to know if there is a substitute pt can use until they can get the Jardiance.  BS is running 170 -to 190 and pt is urinating very frequently.

## 2024-05-30 ENCOUNTER — TELEPHONE (OUTPATIENT)
Dept: FAMILY MEDICINE | Facility: CLINIC | Age: 64
End: 2024-05-30
Payer: COMMERCIAL

## 2024-05-30 RX ORDER — DAPAGLIFLOZIN 5 MG/1
5 TABLET, FILM COATED ORAL DAILY
Qty: 90 TABLET | Refills: 1 | Status: SHIPPED | OUTPATIENT
Start: 2024-05-30 | End: 2024-09-19

## 2024-05-30 NOTE — TELEPHONE ENCOUNTER
There are other medications that can be prescribed. But Epic is not telling which ones are covered by insurance or not. It ayo be helpful to know which meds for diabetes are inher insurance formulary to avoid the back and forth messaging and delay in getting the medication.

## 2024-05-30 NOTE — TELEPHONE ENCOUNTER
PA Initiation    Medication: dapagliflozin (FARXIGA) 5 MG TABS tablet   Insurance Company:  [employer]   [facility]   Filling Pharmacy Phone:  [phone]  Filling Pharmacy Fax:  [phone]  Start Date:  05/30/2024

## 2024-05-30 NOTE — TELEPHONE ENCOUNTER
Spoke with spouse and he stated that insurance told him they can not give him any options on medication that would be covered as they do not know the pts health history. Spouse is requesting to prescribe another medication at this point. Spouse states no injectables and no metformin due to allergy    Urmila Rosa on 5/30/2024 at 3:30 PM

## 2024-05-30 NOTE — TELEPHONE ENCOUNTER
Anton notified.  He will contact insurance and let us know if a like product is on her pharmacy formulary.  Di Kline RN

## 2024-05-31 NOTE — TELEPHONE ENCOUNTER
Retail Pharmacy Prior Authorization Team   Phone: [phone]    PA Initiation    Medication: JARDIANCE 10 MG PO TABS  Insurance Company: [employer] - Phone [phone] Fax [phone]  Pharmacy Filling the Rx:    Filling Pharmacy Phone:    Filling Pharmacy Fax:    Start Date: 5/31/2024

## 2024-05-31 NOTE — TELEPHONE ENCOUNTER
Prior Authorization Approval    Medication: JARDIANCE 10 MG PO TABS  Authorization Effective Date: 5/31/2024  Authorization Expiration Date: 12/31/2099  Approved Dose/Quantity:   Reference #:     Insurance Company: [employer] - Phone [phone] Fax [phone]  Expected CoPay: $    CoPay Card Available:      Financial Assistance Needed:   Which [facility] [id number] [place] [address] [address]   Pharmacy Notified: Yes  Patient Notified:

## 2024-06-11 NOTE — TELEPHONE ENCOUNTER
Prior Authorization Not Needed per Insurance    Medication: FARXIGA 5 MG PO TABS  Insurance Company: [employer] - Phone [phone] Fax [phone]  Expected CoPay: $    [id number] [place] [place] [facility] [address]   Pharmacy Notified:   Patient Notified:       Called insurance and spoke with Estevan to see if this medication actually requires a PA, since when you close the box on CMM, it states PA is not needed.  He states at the retail pharmacy the patient can only fill 30 per 30.  If they want a 90 day supply they will have to fill at a [place] pharmacy.     Called and informed pharmacy.  **Instructed pharmacy to notify patient when script is ready to /ship.**

## 2024-06-11 NOTE — TELEPHONE ENCOUNTER
Retail Pharmacy Prior Authorization Team   Phone: [phone]    PA Initiation    Medication: FARXIGA 5 MG PO TABS  Insurance Company: [employer] - Phone [phone] Fax [phone]  [id number] [address] [place], [place] - [facility] [address] [facility]  Filling Pharmacy Phone: [phone]  Filling Pharmacy Fax:    Start Date: 6/11/2024

## 2024-06-21 DIAGNOSIS — E78.5 HYPERLIPIDEMIA, UNSPECIFIED HYPERLIPIDEMIA TYPE: ICD-10-CM

## 2024-06-21 DIAGNOSIS — E11.9 TYPE 2 DIABETES MELLITUS WITHOUT COMPLICATION, WITHOUT LONG-TERM CURRENT USE OF INSULIN (H): ICD-10-CM

## 2024-06-21 RX ORDER — ROSUVASTATIN CALCIUM 10 MG/1
10 TABLET, COATED ORAL DAILY
Qty: 90 TABLET | Refills: 0 | Status: SHIPPED | OUTPATIENT
Start: 2024-06-21 | End: 2024-08-20

## 2024-06-28 ENCOUNTER — HOSPITAL ENCOUNTER (EMERGENCY)
Facility: CLINIC | Age: 64
Discharge: HOME | End: 2024-06-28
Payer: COMMERCIAL

## 2024-06-28 ENCOUNTER — APPOINTMENT (OUTPATIENT)
Dept: GENERAL RADIOLOGY | Facility: CLINIC | Age: 64
End: 2024-06-28
Payer: COMMERCIAL

## 2024-06-28 VITALS
OXYGEN SATURATION: 96 % | HEART RATE: 96 BPM | SYSTOLIC BLOOD PRESSURE: 140 MMHG | RESPIRATION RATE: 24 BRPM | DIASTOLIC BLOOD PRESSURE: 72 MMHG | TEMPERATURE: 99.4 F

## 2024-06-28 DIAGNOSIS — J20.8 VIRAL BRONCHITIS: ICD-10-CM

## 2024-06-28 PROCEDURE — 71046 X-RAY EXAM CHEST 2 VIEWS: CPT

## 2024-06-28 PROCEDURE — 99213 OFFICE O/P EST LOW 20 MIN: CPT

## 2024-06-28 PROCEDURE — G0463 HOSPITAL OUTPT CLINIC VISIT: HCPCS | Mod: 25

## 2024-06-28 RX ORDER — PREDNISONE 20 MG/1
TABLET ORAL
Qty: 10 TABLET | Refills: 0 | Status: SHIPPED | OUTPATIENT
Start: 2024-06-28 | End: 2024-09-26

## 2024-06-28 ASSESSMENT — ACTIVITIES OF DAILY LIVING (ADL): ADLS_ACUITY_SCORE: 35

## 2024-06-28 ASSESSMENT — COLUMBIA-SUICIDE SEVERITY RATING SCALE - C-SSRS
6. HAVE YOU EVER DONE ANYTHING, STARTED TO DO ANYTHING, OR PREPARED TO DO ANYTHING TO END YOUR LIFE?: NO
2. HAVE YOU ACTUALLY HAD ANY THOUGHTS OF KILLING YOURSELF IN THE PAST MONTH?: NO
1. IN THE PAST MONTH, HAVE YOU WISHED YOU WERE DEAD OR WISHED YOU COULD GO TO SLEEP AND NOT WAKE UP?: NO

## 2024-06-28 NOTE — ED PROVIDER NOTES
History     Chief Complaint   Patient presents with    Cough     Cough, Fatigue  x's 1 week   Denies wheezing, SOB , FEVER     HPI  Maranda Thrasher is a 63 year old female who presents with her  for evaluation of cough and fatigue ongoing for the past week.  Her  provides majority of the history.  Reports she has been coughing since last week and this has increased in frequency and severity where she is up coughing at nighttime.  Coughing has become productive with greenish sputum.  Denies history of lung diseases such as asthma or COPD.  She did try an albuterol inhaler which did not seem to help.  No fever or chills, shortness of breath or trouble with breathing, wheezing, chest pain or tightness, palpitations, nausea, vomiting, or diarrhea.    Allergies:  Allergies   Allergen Reactions    Metformin Diarrhea    Penicillins Unknown       Problem List:    Patient Active Problem List    Diagnosis Date Noted    Class 2 severe obesity due to excess calories with serious comorbidity in adult (H) 05/02/2024     Priority: Medium    Diabetes mellitus, type 2 (H) 05/29/2017     Priority: Medium    CVA (cerebral vascular accident) (H) 05/29/2017     Priority: Medium    Vasculitis (H24) 05/29/2017     Priority: Medium    Hyperlipidemia 05/29/2017     Priority: Medium    Seizure disorder (H) 05/29/2017     Priority: Medium    Tachycardia, unspecified 02/09/2011     Priority: Medium     Tachycardia        Essential hypertension 02/09/2011     Priority: Medium     Hypertension        Pure hypercholesterolemia 08/04/2010     Priority: Medium     Hypercholesterolemia        Orthostatic hypotension 07/21/2010     Priority: Medium     Orthostatic Hypotension        Urinary tract infection, site not specified 07/14/2010     Priority: Medium     Urinary Tract Infection        Memory loss 07/14/2010     Priority: Medium     Memory Lapses Or Loss        Dermatophytosis 04/25/2009     Priority: Medium      Dermatophytosis        Primary hypercoagulable state (H24) 04/24/2009     Priority: Medium     Prothrombin Gene Mutation        Depressive disorder 04/23/2009     Priority: Medium     Depression        Anxiety state 04/23/2009     Priority: Medium     Anxiety Disorder NOS            Past Medical History:    No past medical history on file.    Past Surgical History:    No past surgical history on file.    Family History:    No family history on file.    Social History:  Marital Status:   [2]  Social History     Tobacco Use    Smoking status: Never    Smokeless tobacco: Never   Vaping Use    Vaping status: Never Used        Medications:    predniSONE (DELTASONE) 20 MG tablet  aspirin 81 MG EC tablet  blood glucose test (ACCU-CHEK CIARA PLUS TEST STRP) strips  blood-glucose meter Misc  dapagliflozin (FARXIGA) 5 MG TABS tablet  generic lancets (FINGERSTIX LANCETS)  glipiZIDE (GLUCOTROL XL) 5 MG 24 hr tablet  latanoprost (XALATAN) 0.005 % ophthalmic solution  levETIRAcetam (KEPPRA) 500 MG tablet  lisinopril (ZESTRIL) 2.5 MG tablet  nystatin (MYCOSTATIN) 029683 UNIT/GM external cream  rosuvastatin (CRESTOR) 10 MG tablet  triamcinolone (KENALOG) 0.1 % cream          Review of Systems  Pertinent review of systems as documented per HPI above.    Physical Exam   BP: (!) 140/72  Pulse: 96  Temp: 99.4  F (37.4  C)  Resp: 24  SpO2: 96 %      Physical Exam  Vitals and nursing note reviewed.   Constitutional:       General: She is not in acute distress.     Appearance: Normal appearance. She is not ill-appearing, toxic-appearing or diaphoretic.   HENT:      Head: Atraumatic.      Nose: Nose normal. No congestion or rhinorrhea.      Mouth/Throat:      Mouth: Mucous membranes are moist.      Pharynx: Oropharynx is clear. No oropharyngeal exudate or posterior oropharyngeal erythema.   Eyes:      Extraocular Movements: Extraocular movements intact.      Conjunctiva/sclera: Conjunctivae normal.   Cardiovascular:      Rate and  Rhythm: Normal rate.   Pulmonary:      Effort: Pulmonary effort is normal. No respiratory distress.      Breath sounds: No stridor. Rhonchi present. No wheezing or rales.   Skin:     General: Skin is warm and dry.   Neurological:      Mental Status: She is alert. Mental status is at baseline.   Psychiatric:         Mood and Affect: Mood normal.         Behavior: Behavior normal.         ED Course     Results for orders placed or performed during the hospital encounter of 06/28/24 (from the past 24 hour(s))   XR Chest 2 Views    Narrative    EXAM: XR CHEST 2 VIEWS  LOCATION: [facility]  DATE: 6/28/2024    INDICATION: Cough, congestion x1w.  COMPARISON: None.      Impression    IMPRESSION: Negative chest.       Medications - No data to display    Assessments & Plan (with Medical Decision Making)     I have reviewed the nursing notes.    I have reviewed the findings, diagnosis, plan and need for follow up with the patient.  63 year old female who presents with her  for evaluation of cough and fatigue ongoing for the past week.  Her  provides majority of the history.  Reports she has been coughing since last week and this has increased in frequency and severity where she is up coughing at nighttime.  Coughing has become productive with greenish sputum.  Denies history of lung diseases such as asthma or COPD.  She did try an albuterol inhaler which did not seem to help.  No fever or chills, shortness of breath or trouble with breathing, wheezing, chest pain or tightness, palpitations, nausea, vomiting, or diarrhea.    On exam, she is well-appearing with slight hypertension and elevated temperature at 99.4  F.  On auscultation there is some rhonchi heard without wheezing or signs of respiratory distress.  I ordered a chest x-ray to evaluate for pneumonia given ongoing cough and fatigue.  I independently reviewed this x-ray and did not see any signs of focal consolidation, pleural  effusion or pneumothorax.  Suspect symptoms related to viral bronchitis.  Plan to treat with a prednisone burst, discussed potential adverse effect of hyperglycemia and advised to monitor blood sugars while on this.  Discussed that bronchitis may linger for up to 8 weeks.  Advised that if symptoms worsen despite treatment that she follow-up with PCP for further evaluation and treatment.  All questions answered.  Patient and her  verbalized understanding and agreement with the above plan.    Disclaimer: This note consists of symbols derived from keyboarding, dictation, and/or voice recognition software. As a result, there may be errors in the script that have gone undetected.  Please consider this when interpreting information found in the chart.      Discharge Medication List as of 6/28/2024  6:22 PM        START taking these medications    Details   predniSONE (DELTASONE) 20 MG tablet Take two tablets (= 40mg) each day for 5 (five) days, Disp-10 tablet, R-0, E-Prescribe             Final diagnoses:   Viral bronchitis       6/28/2024   [facility] EMERGENCY DEPT       Char Ponce PA-C  06/28/24 1824

## 2024-07-03 ENCOUNTER — TELEPHONE (OUTPATIENT)
Dept: FAMILY MEDICINE | Facility: CLINIC | Age: 64
End: 2024-07-03

## 2024-07-03 ENCOUNTER — OFFICE VISIT (OUTPATIENT)
Dept: FAMILY MEDICINE | Facility: CLINIC | Age: 64
End: 2024-07-03
Payer: COMMERCIAL

## 2024-07-03 VITALS
HEIGHT: 61 IN | SYSTOLIC BLOOD PRESSURE: 124 MMHG | OXYGEN SATURATION: 98 % | RESPIRATION RATE: 16 BRPM | TEMPERATURE: 97.8 F | HEART RATE: 84 BPM | BODY MASS INDEX: 37.57 KG/M2 | DIASTOLIC BLOOD PRESSURE: 80 MMHG | WEIGHT: 199 LBS

## 2024-07-03 DIAGNOSIS — B37.31 CANDIDAL VULVOVAGINITIS: Primary | ICD-10-CM

## 2024-07-03 DIAGNOSIS — N89.8 VAGINAL DISCHARGE: ICD-10-CM

## 2024-07-03 DIAGNOSIS — R35.0 URINARY FREQUENCY: ICD-10-CM

## 2024-07-03 LAB
ALBUMIN UR-MCNC: NEGATIVE MG/DL
APPEARANCE UR: CLEAR
BACTERIA #/AREA URNS HPF: ABNORMAL /HPF
BILIRUB UR QL STRIP: NEGATIVE
CLUE CELLS: ABNORMAL
COLOR UR AUTO: YELLOW
GLUCOSE UR STRIP-MCNC: >=1000 MG/DL
HGB UR QL STRIP: ABNORMAL
KETONES UR STRIP-MCNC: NEGATIVE MG/DL
LEUKOCYTE ESTERASE UR QL STRIP: ABNORMAL
NITRATE UR QL: NEGATIVE
PH UR STRIP: 5.5 [PH] (ref 5–7)
RBC #/AREA URNS AUTO: ABNORMAL /HPF
SP GR UR STRIP: 1.01 (ref 1–1.03)
SQUAMOUS #/AREA URNS AUTO: ABNORMAL /LPF
TRICHOMONAS, WET PREP: ABNORMAL
UROBILINOGEN UR STRIP-ACNC: 0.2 E.U./DL
WBC #/AREA URNS AUTO: ABNORMAL /HPF
WBC CLUMPS #/AREA URNS HPF: PRESENT /HPF
WBC'S/HIGH POWER FIELD, WET PREP: ABNORMAL
YEAST, WET PREP: ABNORMAL

## 2024-07-03 PROCEDURE — 81001 URINALYSIS AUTO W/SCOPE: CPT | Performed by: NURSE PRACTITIONER

## 2024-07-03 PROCEDURE — G2211 COMPLEX E/M VISIT ADD ON: HCPCS | Performed by: NURSE PRACTITIONER

## 2024-07-03 PROCEDURE — 87210 SMEAR WET MOUNT SALINE/INK: CPT | Performed by: NURSE PRACTITIONER

## 2024-07-03 PROCEDURE — 99213 OFFICE O/P EST LOW 20 MIN: CPT | Performed by: NURSE PRACTITIONER

## 2024-07-03 RX ORDER — FLUCONAZOLE 150 MG/1
150 TABLET ORAL DAILY
Qty: 3 TABLET | Refills: 0 | Status: SHIPPED | OUTPATIENT
Start: 2024-07-03 | End: 2024-07-06

## 2024-07-03 RX ORDER — EMPAGLIFLOZIN 10 MG/1
10 TABLET, FILM COATED ORAL DAILY
COMMUNITY
Start: 2024-05-31 | End: 2024-09-26

## 2024-07-03 NOTE — TELEPHONE ENCOUNTER
RN reached out to patient at request of AFR for rescheduling today's appt due to provider call-in.   RN assisted with getting patient into [facility] in same-day slot.    Shaniqua Cook RN  [facility]

## 2024-07-03 NOTE — PROGRESS NOTES
"  Assessment & Plan     Candidal vulvovaginitis  For now we will give her 3 doses of Diflucan to try to heal the area if it is not better after that I would consider 1 dose per week for 2-4 weeks to see if that can improve.  This is a significantly bad rash.  Wet prep was negative for bacterial vaginitis and internal vaginal yeast infection as well as a UA was negative.  Likely she is spilling glucose because of the Jardiance.  I concern I would have is that Jardiance could be making this worse.  So if this does not improve with Diflucan it may that be that she needs to be switched off of Jardiance and moved to something like a GLP-1 agonist for her diabetes.  - fluconazole (DIFLUCAN) 150 MG tablet; Take 1 tablet (150 mg) by mouth daily for 3 doses    Vaginal discharge  - Wet prep - Clinic Collect    Urinary frequency  - UA Macroscopic with reflex to Microscopic and Culture - Clinic Collect  - UA Microscopic with Reflex to Culture          BMI  Estimated body mass index is 37.6 kg/m  as calculated from the following:    Height as of this encounter: 1.549 m (5' 1\").    Weight as of this encounter: 90.3 kg (199 lb).         FUTURE APPOINTMENTS:       - Follow-up visit in as needed for no improvement in symptoms.    Raina Low is a 63 year old, presenting for the following health issues:  Derm Problem      Via the Health Maintenance questionnaire, the patient has reported the following services have been completed -Eye Exam: [place] [facility] 2024-04-10, this information has been sent to the abstraction team.  History of Present Illness       Reason for visit:  Diaper rash    She eats 2-3 servings of fruits and vegetables daily.She consumes 1 sweetened beverage(s) daily.She exercises with enough effort to increase her heart rate 9 or less minutes per day.  She exercises with enough effort to increase her heart rate 3 or less days per week.   She is taking medications regularly.         Patient is here " "with her  for Candida yeast infection of her vulva.  Symptoms recently worsened but not started with Jardiance.  Her  states that they were worse before she started Jardiance.  She recently had a change up of her diabetic medications.  And started on Jardiance to help treat her diabetes better.  She has not noticed any significant symptoms with that but overall has felt better.  She initially had intertrigo yeast infection of the vulva and then it worsened where it became more excoriated.  She did use Diflucan pill which helped but then after she was done with it symptoms seem to come back worse.     states that his wife is also having a lot of urinary frequency.    Review of Systems  Constitutional, HEENT, cardiovascular, pulmonary, gi and gu systems are negative, except as otherwise noted.      Objective    /80 (BP Location: Right arm, Patient Position: Sitting, Cuff Size: Adult Large)   Pulse 84   Temp 97.8  F (36.6  C) (Tympanic)   Resp 16   Ht 1.549 m (5' 1\")   Wt 90.3 kg (199 lb)   SpO2 98%   BMI 37.60 kg/m    Body mass index is 37.6 kg/m .  Physical Exam  Constitutional:       Appearance: Normal appearance.   HENT:      Head: Normocephalic.   Pulmonary:      Effort: Pulmonary effort is normal. No respiratory distress.   Genitourinary:     Labia:         Right: Lesion present.         Left: Lesion present.       Comments: Bilateral labia majora and minora are both excoriated with significant Candida there are some areas of lesions that look like they were bleeding.  Skin:     General: Skin is warm and dry.   Neurological:      Mental Status: She is alert and oriented to person, place, and time.   Psychiatric:         Mood and Affect: Mood normal.         Behavior: Behavior normal.         Thought Content: Thought content normal.         Judgment: Judgment normal.                    Signed Electronically by: DUC Paul CNP    "

## 2024-07-10 ENCOUNTER — MYC MEDICAL ADVICE (OUTPATIENT)
Dept: FAMILY MEDICINE | Facility: CLINIC | Age: 64
End: 2024-07-10
Payer: COMMERCIAL

## 2024-07-10 RX ORDER — FLUCONAZOLE 200 MG/1
200 TABLET ORAL
Qty: 4 TABLET | Refills: 0 | Status: SHIPPED | OUTPATIENT
Start: 2024-07-10 | End: 2024-08-07

## 2024-08-18 DIAGNOSIS — E11.9 DM (DIABETES MELLITUS) (H): ICD-10-CM

## 2024-08-19 DIAGNOSIS — E78.5 HYPERLIPIDEMIA, UNSPECIFIED HYPERLIPIDEMIA TYPE: ICD-10-CM

## 2024-08-19 DIAGNOSIS — E11.9 TYPE 2 DIABETES MELLITUS WITHOUT COMPLICATION, WITHOUT LONG-TERM CURRENT USE OF INSULIN (H): ICD-10-CM

## 2024-08-19 RX ORDER — LISINOPRIL 2.5 MG/1
2.5 TABLET ORAL DAILY
Qty: 90 TABLET | Refills: 0 | Status: SHIPPED | OUTPATIENT
Start: 2024-08-19 | End: 2024-09-26

## 2024-08-20 RX ORDER — ROSUVASTATIN CALCIUM 10 MG/1
10 TABLET, COATED ORAL DAILY
Qty: 90 TABLET | Refills: 2 | Status: SHIPPED | OUTPATIENT
Start: 2024-08-20

## 2024-09-19 ENCOUNTER — MYC REFILL (OUTPATIENT)
Dept: FAMILY MEDICINE | Facility: CLINIC | Age: 64
End: 2024-09-19
Payer: COMMERCIAL

## 2024-09-19 DIAGNOSIS — E11.9 TYPE 2 DIABETES MELLITUS WITHOUT COMPLICATION, WITHOUT LONG-TERM CURRENT USE OF INSULIN (H): ICD-10-CM

## 2024-09-19 RX ORDER — DAPAGLIFLOZIN 5 MG/1
5 TABLET, FILM COATED ORAL DAILY
Qty: 90 TABLET | Refills: 2 | Status: SHIPPED | OUTPATIENT
Start: 2024-09-19

## 2024-09-26 ENCOUNTER — OFFICE VISIT (OUTPATIENT)
Dept: FAMILY MEDICINE | Facility: CLINIC | Age: 64
End: 2024-09-26
Payer: COMMERCIAL

## 2024-09-26 VITALS
HEIGHT: 61 IN | SYSTOLIC BLOOD PRESSURE: 139 MMHG | HEART RATE: 93 BPM | BODY MASS INDEX: 36.25 KG/M2 | TEMPERATURE: 98.5 F | OXYGEN SATURATION: 97 % | WEIGHT: 192 LBS | DIASTOLIC BLOOD PRESSURE: 88 MMHG

## 2024-09-26 DIAGNOSIS — E66.812 CLASS 2 SEVERE OBESITY DUE TO EXCESS CALORIES WITH SERIOUS COMORBIDITY AND BODY MASS INDEX (BMI) OF 36.0 TO 36.9 IN ADULT (H): ICD-10-CM

## 2024-09-26 DIAGNOSIS — B35.9 DERMATOPHYTOSIS: ICD-10-CM

## 2024-09-26 DIAGNOSIS — I10 ESSENTIAL HYPERTENSION: ICD-10-CM

## 2024-09-26 DIAGNOSIS — G89.29 CHRONIC LEFT-SIDED LOW BACK PAIN WITH LEFT-SIDED SCIATICA: ICD-10-CM

## 2024-09-26 DIAGNOSIS — E66.01 CLASS 2 SEVERE OBESITY DUE TO EXCESS CALORIES WITH SERIOUS COMORBIDITY AND BODY MASS INDEX (BMI) OF 36.0 TO 36.9 IN ADULT (H): ICD-10-CM

## 2024-09-26 DIAGNOSIS — G40.909 SEIZURE DISORDER (H): ICD-10-CM

## 2024-09-26 DIAGNOSIS — E11.9 TYPE 2 DIABETES MELLITUS WITHOUT COMPLICATION, WITHOUT LONG-TERM CURRENT USE OF INSULIN (H): ICD-10-CM

## 2024-09-26 DIAGNOSIS — M54.42 CHRONIC LEFT-SIDED LOW BACK PAIN WITH LEFT-SIDED SCIATICA: ICD-10-CM

## 2024-09-26 DIAGNOSIS — E78.5 HYPERLIPIDEMIA, UNSPECIFIED HYPERLIPIDEMIA TYPE: ICD-10-CM

## 2024-09-26 DIAGNOSIS — Z00.00 HEALTHCARE MAINTENANCE: ICD-10-CM

## 2024-09-26 DIAGNOSIS — Z76.89 ENCOUNTER TO ESTABLISH CARE: Primary | ICD-10-CM

## 2024-09-26 DIAGNOSIS — I63.9 CEREBROVASCULAR ACCIDENT (CVA), UNSPECIFIED MECHANISM (H): ICD-10-CM

## 2024-09-26 DIAGNOSIS — Z23 NEED FOR VACCINATION: ICD-10-CM

## 2024-09-26 PROBLEM — M54.40 CHRONIC LEFT-SIDED LOW BACK PAIN WITH SCIATICA: Status: ACTIVE | Noted: 2024-09-26

## 2024-09-26 LAB
EST. AVERAGE GLUCOSE BLD GHB EST-MCNC: 212 MG/DL
HBA1C MFR BLD: 9 % (ref 0–5.6)
HOLD SPECIMEN: NORMAL

## 2024-09-26 PROCEDURE — 36415 COLL VENOUS BLD VENIPUNCTURE: CPT | Performed by: NURSE PRACTITIONER

## 2024-09-26 PROCEDURE — 91320 SARSCV2 VAC 30MCG TRS-SUC IM: CPT | Performed by: NURSE PRACTITIONER

## 2024-09-26 PROCEDURE — 99214 OFFICE O/P EST MOD 30 MIN: CPT | Mod: 25 | Performed by: NURSE PRACTITIONER

## 2024-09-26 PROCEDURE — 90471 IMMUNIZATION ADMIN: CPT | Performed by: NURSE PRACTITIONER

## 2024-09-26 PROCEDURE — 83036 HEMOGLOBIN GLYCOSYLATED A1C: CPT | Performed by: NURSE PRACTITIONER

## 2024-09-26 PROCEDURE — 90673 RIV3 VACCINE NO PRESERV IM: CPT | Performed by: NURSE PRACTITIONER

## 2024-09-26 PROCEDURE — 90480 ADMN SARSCOV2 VAC 1/ONLY CMP: CPT | Performed by: NURSE PRACTITIONER

## 2024-09-26 RX ORDER — LISINOPRIL 2.5 MG/1
2.5 TABLET ORAL DAILY
Qty: 90 TABLET | Refills: 0 | Status: SHIPPED | OUTPATIENT
Start: 2024-09-26

## 2024-09-26 RX ORDER — FLUCONAZOLE 200 MG/1
200 TABLET ORAL
Qty: 4 TABLET | Refills: 0 | Status: SHIPPED | OUTPATIENT
Start: 2024-09-26 | End: 2024-10-24

## 2024-09-26 NOTE — ASSESSMENT & PLAN NOTE
Wt Readings from Last 4 Encounters:   09/26/24 87.1 kg (192 lb)   07/03/24 90.3 kg (199 lb)   05/02/24 94.8 kg (209 lb)   04/23/24 98.4 kg (217 lb)     Patient has lost weight over the course of the year.    Phenotype: Metabolic dysregulation postmenopause  Comorbid conditions include diabetes type 2, hyperlipidemia, hypertension.    Nutrition: Patient eats a standard American diet as well as a very high refined sugar carbohydrate diet.  We did spend some time discussing that these types of foods were very detrimental to her health as her body has a difficulty time processing them.  Likely this is the cause of her diabetes.  Discussed that she could eat proteins and fibrous vegetables, dairy.  Cognitively patient has difficulty with memory.  Her  does try to keep those types of foods out of the home however when they are grocery shopping she is finding him to put them into the cart.  Exercise: We did spend some time discussing exercise they could consider a stationary bike, or a walking pad that would not be too expensive or secondhand that she could use safely in the home.  He does not feel comfortable her walking outside by herself as she can get lost.  Does do the stairs throughout the day in their home which I think is positive.  Medication: Currently she is Farixga may be contributing to her weight loss that she has had over the course of the year.  Her diabetes is not under control so we will also add Rybelsus.  She is not open to a GLP-1 agonist injectable.  Discussed that Rybelsus could help with her sugar cravings and that is a desirable mechanism of action.

## 2024-09-26 NOTE — PATIENT INSTRUCTIONS
Restart the diflucan 1 pill every 7 days for 4 weeks  Start Rebylsus 3 mg once a day  Continue Farxiga 5 mg once daily  Continue Glipizide 5 mg once daily

## 2024-09-26 NOTE — ASSESSMENT & PLAN NOTE
Has been seen by dermatologist who said it was benign.  He told them that it would eventually go away on its own.  Does not bother her or cause any itching.

## 2024-09-26 NOTE — ASSESSMENT & PLAN NOTE
BP Readings from Last 3 Encounters:   09/26/24 139/88   07/03/24 124/80   06/28/24 (!) 140/72     Goal blood pressure 130/80    Not quite at goal today.  Will have them recheck at home as well as have a rate check with me in 1 month we can see where she is at.  Consider going up to lisinopril 5 mg if it continues to be slightly elevated.  Medication: Lisinopril 2.5 mg once daily.

## 2024-09-26 NOTE — ASSESSMENT & PLAN NOTE
Recent Labs   Lab Test 01/02/24  1326 02/04/22  0912   CHOL 152 256*   HDL 48* 45*   LDL 76 162*   TRIG 141 243*     LDL is at goal  Medication: Rosuvastatin 10 mg, continue as is.

## 2024-09-26 NOTE — ASSESSMENT & PLAN NOTE
Lab Results   Component Value Date    A1C 9.0 09/26/2024    A1C 6.8 05/02/2024    A1C 7.9 01/02/2024    A1C 7.6 07/11/2023    A1C 8.6 05/02/2022     MDM: There has been some changing of her medications between Dr. Islas and Dr. Barton.  For the past year.  She was switched from Jardiance to Farixga.  Has been on metformin but developed GI upset and chronic diarrhea from it so has been discontinued.  He also has been on Actos which caused her to have significant leg pain so that was also discontinued.  Her  had a bad reaction to Ozempic and so they were not particularly open to GLP-1 agonist medications.  I did spend some time discussing these drugs as a class with them discussing risks versus benefits.  I do feel that these are the best drugs for her overall given that she is not related to her 's possible that she would have not to have those side effects.  Mounjaro likely would be a best medication because it was the least likely to have those GI side effects however she is not open to injections.  So we will try Rybelsus to see if she will tolerate this GLP-1 agonist without too many side effects.  She has been having issues with chronic yeast infections in the vulva.  With the switch up of Jardiance we will see if it can be cleared up however if she continues to have them I would have to discontinue Farixga.  Other drug to consider would be Januvia.  Eventually it would be nice to discontinue glipizide if her A1c is under control with other drugs.  However I do think glipizide is preferable over insulin is not an ideal medication long-term.  She is not currently having side effects and has not had side effects from glipizide in the past.    See above for nutrition and exercise that we discussed at this visit.  Medications: Farxiga 5 mg daily, Rybelsus 3 mg daily, glipizide 5 mg daily  Aspirin 81 mg  ACE/ARB: Lisinopril 2.5 mg consider going up if blood pressure is not at goal  Statin: Rosuvastatin  10 mg daily  Foot exam completed today  Eye exam completed 4/10/2024

## 2024-09-26 NOTE — PROGRESS NOTES
Assessment & Plan   Problem List Items Addressed This Visit       Type 2 diabetes mellitus without complication, without long-term current use of insulin (H)     Lab Results   Component Value Date    A1C 9.0 09/26/2024    A1C 6.8 05/02/2024    A1C 7.9 01/02/2024    A1C 7.6 07/11/2023    A1C 8.6 05/02/2022     MDM: There has been some changing of her medications between Dr. Islas and Dr. Barton.  For the past year.  She was switched from Jardiance to Farixga.  Has been on metformin but developed GI upset and chronic diarrhea from it so has been discontinued.  He also has been on Actos which caused her to have significant leg pain so that was also discontinued.  Her  had a bad reaction to Ozempic and so they were not particularly open to GLP-1 agonist medications.  I did spend some time discussing these drugs as a class with them discussing risks versus benefits.  I do feel that these are the best drugs for her overall given that she is not related to her 's possible that she would have not to have those side effects.  Mounjaro likely would be a best medication because it was the least likely to have those GI side effects however she is not open to injections.  So we will try Rybelsus to see if she will tolerate this GLP-1 agonist without too many side effects.  She has been having issues with chronic yeast infections in the vulva.  With the switch up of Jardiance we will see if it can be cleared up however if she continues to have them I would have to discontinue Farixga.  Other drug to consider would be Januvia.  Eventually it would be nice to discontinue glipizide if her A1c is under control with other drugs.  However I do think glipizide is preferable over insulin is not an ideal medication long-term.  She is not currently having side effects and has not had side effects from glipizide in the past.    See above for nutrition and exercise that we discussed at this visit.  Medications: Farxiga 5 mg  daily, Rybelsus 3 mg daily, glipizide 5 mg daily  Aspirin 81 mg  ACE/ARB: Lisinopril 2.5 mg consider going up if blood pressure is not at goal  Statin: Rosuvastatin 10 mg daily  Foot exam completed today  Eye exam completed 4/10/2024         Relevant Medications    lisinopril (ZESTRIL) 2.5 MG tablet    Semaglutide (RYBELSUS) 3 MG tablet    Other Relevant Orders    Albumin Random Urine Quantitative with Creat Ratio    Hemoglobin A1c (Completed)    Albumin Random Urine Quantitative with Creat Ratio    CVA (cerebral vascular accident) (H)     History of CVA in the past this is affected her her memory.  Her  takes care of her at home.  He does have some concern over her safety if she were to step out of their home.  Typically she will stay home when he is not there.  She does walk up and down the stairs for exercise throughout the day.  Today we briefly discussed other options if he is concerned about her safety as she walks outside of their house or even within the house.  Including a safety button or GPS.that could be on her person so he could find her on his phone.  He states that she cannot use a cell phone properly.          Hyperlipidemia     Recent Labs   Lab Test 01/02/24  1326 02/04/22  0912   CHOL 152 256*   HDL 48* 45*   LDL 76 162*   TRIG 141 243*     LDL is at goal  Medication: Rosuvastatin 10 mg, continue as is.         Seizure disorder (H)     Medication: Keppra 500 mg daily no recent seizures.         Essential hypertension     BP Readings from Last 3 Encounters:   09/26/24 139/88   07/03/24 124/80   06/28/24 (!) 140/72     Goal blood pressure 130/80    Not quite at goal today.  Will have them recheck at home as well as have a rate check with me in 1 month we can see where she is at.  Consider going up to lisinopril 5 mg if it continues to be slightly elevated.  Medication: Lisinopril 2.5 mg once daily.         Relevant Medications    lisinopril (ZESTRIL) 2.5 MG tablet    Dermatophytosis     Has  been seen by dermatologist who said it was benign.  He told them that it would eventually go away on its own.  Does not bother her or cause any itching.         Class 2 severe obesity due to excess calories with serious comorbidity in adult (H)     Wt Readings from Last 4 Encounters:   09/26/24 87.1 kg (192 lb)   07/03/24 90.3 kg (199 lb)   05/02/24 94.8 kg (209 lb)   04/23/24 98.4 kg (217 lb)     Patient has lost weight over the course of the year.    Phenotype: Metabolic dysregulation postmenopause  Comorbid conditions include diabetes type 2, hyperlipidemia, hypertension.    Nutrition: Patient eats a standard American diet as well as a very high refined sugar carbohydrate diet.  We did spend some time discussing that these types of foods were very detrimental to her health as her body has a difficulty time processing them.  Likely this is the cause of her diabetes.  Discussed that she could eat proteins and fibrous vegetables, dairy.  Cognitively patient has difficulty with memory.  Her  does try to keep those types of foods out of the home however when they are grocery shopping she is finding him to put them into the cart.  Exercise: We did spend some time discussing exercise they could consider a stationary bike, or a walking pad that would not be too expensive or secondhand that she could use safely in the home.  He does not feel comfortable her walking outside by herself as she can get lost.  Does do the stairs throughout the day in their home which I think is positive.  Medication: Currently she is Farixga may be contributing to her weight loss that she has had over the course of the year.  Her diabetes is not under control so we will also add Rybelsus.  She is not open to a GLP-1 agonist injectable.  Discussed that Rybelsus could help with her sugar cravings and that is a desirable mechanism of action.           Relevant Medications    Semaglutide (RYBELSUS) 3 MG tablet    Healthcare maintenance      "Patient declines Pap smear, mammogram, colonoscopy.  States she has not done these for years and she is not interested in any preventative procedures.  Vaccines: Influenza and COVID vaccines are updated today.         Chronic left-sided low back pain with sciatica     Patient is currently doing treatment with a chiropractor.  She states that has improved.  Initially started after a fall.  No x-ray or imaging has been done.  They declined that today.  They will continue to work with a chiropractor and if they need any further assistance they will let me know.  Consider x-ray lumbar imaging, physical therapy as well as spine care if needed.  She is having radiculopathy symptoms down the left side particularly along the L5 dermatome.          Other Visit Diagnoses       Encounter to establish care    -  Primary    Intertrigo labialis        Relevant Medications    fluconazole (DIFLUCAN) 200 MG tablet    Need for vaccination        Relevant Orders    INFLUENZA VACCINE TRIVALENT(FLUBLOK) (Completed)    COVID-19 12+ (PFIZER) (Completed)           Intertrigo: Will try 1 more month of oral Diflucan.  If no improvement in symptoms in 1 month then I would discontinue Farixga and start a different diabetic medication.       BMI  Estimated body mass index is 36.28 kg/m  as calculated from the following:    Height as of this encounter: 1.549 m (5' 1\").    Weight as of this encounter: 87.1 kg (192 lb).   Weight management plan: see above    FUTURE APPOINTMENTS:       - Follow-up for annual visit or as needed      Raina Low is a 63 year old, presenting for the following health issues:  Recheck Medication        9/26/2024     2:17 PM   Additional Questions   Roomed by  Here today with her  Anton  Lizzeth DONYA DIOP     History of Present Illness       Diabetes:   She presents for follow up of diabetes.    She is not checking blood glucose.         She has no concerns regarding her diabetes at this time.   She is not " experiencing numbness or burning in feet, excessive thirst, blurry vision, weight changes or redness, sores or blisters on feet.           Reason for visit:  A1c check and follow up on rash    She eats 2-3 servings of fruits and vegetables daily.She consumes 2 sweetened beverage(s) daily.She exercises with enough effort to increase her heart rate 9 or less minutes per day.  She exercises with enough effort to increase her heart rate 3 or less days per week.   She is taking medications regularly.     Rash cleared up mostly has improved returned about 2 weeks ago.  Is having a little bit of bleeding in the vulva.  Still is uncomfortable in the vulvar area.  Looking at notes from previous doctors it appears that she had this before she started on Jardiance so it is unclear if the the SGLT2 inhibitors were contributing to the intertrigo.    Stopped the Jardiance, insurance was having an issues. She is currently taking the Farxiga.     Back pain lower lumbar has been ongoing through the summer with pain in left leg that radiates down the left leg.  She is currently working with chiropractor.  They state that the back pain started after a fall she is good strength in her legs and does not feel that she has weakness.  Has good sensation no neuropathy.  History of incontinence, and bladder control issues.  This has improved this past month and has not had hardly any accidents.  Continue to monitor it might be with the changing of the medications that she had more improvement in control  She has had issues with Actos and adverse drug reaction.  Would really like to avoid this drug in the future.  She is also not interested in injectables.  Her  had issues with the Ozempic having significant gastritis and abdominal pain.  They are concerned about this drug for her as well.  Medications were changed from Jardiance to Farxiga, glipizide was dropped from 10 mg to 5 mg with starting the SGLT2 inhibitor she has had issues  "about metformin in the past causing GI upset and chronic diarrhea.  Daily blood glucose over 200 when she wasn't on jardiance.  She eats a very standard American diet with high sugar, starches and grains.  She states she does not like to eat any other types of foods.  Exercise she is unable to get outside of the home because of her memory and her  is out during the day.  Does walk up and down her stairs in her home many times a day.    Heart burn; prilosec over-the-counter.  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  Diabetes Follow-up    BP Readings from Last 2 Encounters:   09/26/24 139/88   07/03/24 124/80     Hemoglobin A1C (%)   Date Value   09/26/2024 9.0 (H)   05/02/2024 6.8 (H)     LDL Cholesterol Calculated (mg/dL)   Date Value   01/02/2024 76   02/04/2022 162 (H)       Review of Systems  Constitutional, HEENT, cardiovascular, pulmonary, gi and gu systems are negative, except as otherwise noted.      Objective    /88 (BP Location: Right arm, Patient Position: Sitting)   Pulse 93   Temp 98.5  F (36.9  C) (Tympanic)   Ht 1.549 m (5' 1\")   Wt 87.1 kg (192 lb)   SpO2 97%   BMI 36.28 kg/m    Body mass index is 36.28 kg/m .  Physical Exam  Constitutional:       Appearance: Normal appearance.   HENT:      Head: Normocephalic.      Nose: Nose normal.      Mouth/Throat:      Mouth: Mucous membranes are moist.      Pharynx: Oropharynx is clear.   Cardiovascular:      Rate and Rhythm: Normal rate and regular rhythm.      Pulses:           Dorsalis pedis pulses are 2+ on the right side and 2+ on the left side.        Posterior tibial pulses are 2+ on the right side and 2+ on the left side.      Heart sounds: Normal heart sounds.   Pulmonary:      Effort: Pulmonary effort is normal.      Breath sounds: Normal breath sounds.   Abdominal:      General: Bowel sounds are normal.      Palpations: Abdomen is soft.   Genitourinary:     Labia:         Right: Rash present.  "        Left: Rash present.       Comments: Has significant Candida rash in the labial region.  Does have some maceration and a small amount of bleeding.  Musculoskeletal:      Cervical back: Normal range of motion.      Right foot: Normal range of motion. No deformity or bunion.      Left foot: Normal range of motion. No deformity or bunion.   Feet:      Right foot:      Protective Sensation: 10 sites tested.  10 sites sensed.      Skin integrity: Skin integrity normal.      Toenail Condition: Right toenails are normal.      Left foot:      Protective Sensation: 10 sites tested.  10 sites sensed.      Skin integrity: Skin integrity normal.      Toenail Condition: Left toenails are normal.      Comments: Neuropathy: none  Lymphadenopathy:      Cervical: No cervical adenopathy.   Skin:     General: Skin is warm and dry.   Neurological:      Mental Status: She is alert and oriented to person, place, and time.   Psychiatric:         Mood and Affect: Mood normal.         Behavior: Behavior normal.         Thought Content: Thought content normal.         Judgment: Judgment normal.                    Signed Electronically by: DUC Paul CNP

## 2024-09-26 NOTE — ASSESSMENT & PLAN NOTE
History of CVA in the past this is affected her her memory.  Her  takes care of her at home.  He does have some concern over her safety if she were to step out of their home.  Typically she will stay home when he is not there.  She does walk up and down the stairs for exercise throughout the day.  Today we briefly discussed other options if he is concerned about her safety as she walks outside of their house or even within the house.  Including a safety button or GPS.that could be on her person so he could find her on his phone.  He states that she cannot use a cell phone properly.

## 2024-09-26 NOTE — ASSESSMENT & PLAN NOTE
Patient is currently doing treatment with a chiropractor.  She states that has improved.  Initially started after a fall.  No x-ray or imaging has been done.  They declined that today.  They will continue to work with a chiropractor and if they need any further assistance they will let me know.  Consider x-ray lumbar imaging, physical therapy as well as spine care if needed.  She is having radiculopathy symptoms down the left side particularly along the L5 dermatome.

## 2024-09-26 NOTE — ASSESSMENT & PLAN NOTE
Patient declines Pap smear, mammogram, colonoscopy.  States she has not done these for years and she is not interested in any preventative procedures.  Vaccines: Influenza and COVID vaccines are updated today.

## 2024-10-21 ENCOUNTER — MYC MEDICAL ADVICE (OUTPATIENT)
Dept: FAMILY MEDICINE | Facility: CLINIC | Age: 64
End: 2024-10-21
Payer: COMMERCIAL

## 2024-10-21 DIAGNOSIS — E11.9 TYPE 2 DIABETES MELLITUS WITHOUT COMPLICATION, WITHOUT LONG-TERM CURRENT USE OF INSULIN (H): Primary | ICD-10-CM

## 2024-10-28 NOTE — TELEPHONE ENCOUNTER
Rybelsus 7 mg has been sent to the pharmacy this is an increase in the dose.    Jessie Shoemaker, APRN, CNP

## 2024-12-02 ENCOUNTER — TELEPHONE (OUTPATIENT)
Dept: FAMILY MEDICINE | Facility: CLINIC | Age: 64
End: 2024-12-02
Payer: COMMERCIAL

## 2024-12-02 DIAGNOSIS — I63.9 CEREBROVASCULAR ACCIDENT (CVA), UNSPECIFIED MECHANISM (H): Primary | ICD-10-CM

## 2024-12-02 NOTE — TELEPHONE ENCOUNTER
Order/Referral Request    Who is requesting: Maranda    Orders being requested: referral for neurology     Reason service is needed/diagnosis: multiple strokes     When are orders needed by: ASAP    Has this been discussed with Provider: No    Does patient have a preference on a Group/Provider/Facility? [facility]     Does patient have an appointment scheduled?: No    Where to send orders: Place orders within Epic    Could we send this information to you in [facility] or would you prefer to receive a phone call?:   Patient would prefer a phone call   Okay to leave a detailed message?: Yes at Cell number on file:    Telephone Information:   Mobile [phone]

## 2024-12-03 NOTE — TELEPHONE ENCOUNTER
Call placed to Patient/ .  Patient is not having any problems recently   states that Patient was seeing neurology in the [facility] but they just moved further away from where Patient lives.  They were also out of network.  Would like like to see someone closer to home and in network.  Would like to see Dr Merlyn Ojeda if possible.  Arun Charles RN

## 2024-12-03 NOTE — TELEPHONE ENCOUNTER
Please triage this request.     I know patient has a history of CVA and her  takes care of her. Please check to see if she recently had a change in her health,  recent Strokes? I see no ED visit. Do they need to be evaluated in the ED or is the  just requesting a follow up appointment with neurology after her history of CVA?    Jessie Shoemaker, APRN, CNP

## 2024-12-07 ENCOUNTER — HEALTH MAINTENANCE LETTER (OUTPATIENT)
Age: 64
End: 2024-12-07

## 2025-01-15 ENCOUNTER — TELEPHONE (OUTPATIENT)
Dept: FAMILY MEDICINE | Facility: CLINIC | Age: 65
End: 2025-01-15
Payer: COMMERCIAL

## 2025-01-15 NOTE — TELEPHONE ENCOUNTER
Patient Quality Outreach    Patient is due for the following:   Diabetes -  A1C, LDL (Fasting), Eye Exam, Microalbumin, and Diabetic Follow-Up Visit    Action(s) Taken:   Schedule a office visit for diabetes follow up    Type of outreach:    Sent [employer] message.    Questions for provider review:    None           Constance Carpenter MA  Chart routed to Care Team.

## 2025-01-18 DIAGNOSIS — E11.9 TYPE 2 DIABETES MELLITUS WITHOUT COMPLICATION, WITHOUT LONG-TERM CURRENT USE OF INSULIN (H): ICD-10-CM

## 2025-01-20 RX ORDER — LISINOPRIL 2.5 MG/1
2.5 TABLET ORAL DAILY
Qty: 90 TABLET | Refills: 0 | Status: SHIPPED | OUTPATIENT
Start: 2025-01-20

## 2025-01-20 NOTE — TELEPHONE ENCOUNTER
GFR Estimate   Date Value Ref Range Status   04/23/2024 90 >60 mL/min/1.73m2 Final   06/14/2021 >60 >60 mL/min/1.73m2 Final

## 2025-01-21 RX ORDER — ORAL SEMAGLUTIDE 3 MG/1
TABLET ORAL DAILY
Qty: 90 TABLET | Refills: 3 | Status: SHIPPED | OUTPATIENT
Start: 2025-01-21

## 2025-02-19 ENCOUNTER — OFFICE VISIT (OUTPATIENT)
Dept: FAMILY MEDICINE | Facility: CLINIC | Age: 65
End: 2025-02-19
Payer: COMMERCIAL

## 2025-02-19 VITALS
RESPIRATION RATE: 16 BRPM | TEMPERATURE: 98.5 F | HEIGHT: 61 IN | OXYGEN SATURATION: 98 % | HEART RATE: 76 BPM | SYSTOLIC BLOOD PRESSURE: 124 MMHG | DIASTOLIC BLOOD PRESSURE: 80 MMHG | WEIGHT: 189 LBS | BODY MASS INDEX: 35.68 KG/M2

## 2025-02-19 DIAGNOSIS — G40.909 SEIZURE DISORDER (H): ICD-10-CM

## 2025-02-19 DIAGNOSIS — Z12.4 CERVICAL CANCER SCREENING: ICD-10-CM

## 2025-02-19 DIAGNOSIS — Z00.00 HEALTHCARE MAINTENANCE: ICD-10-CM

## 2025-02-19 DIAGNOSIS — Z12.31 VISIT FOR SCREENING MAMMOGRAM: ICD-10-CM

## 2025-02-19 DIAGNOSIS — Z11.59 NEED FOR HEPATITIS C SCREENING TEST: ICD-10-CM

## 2025-02-19 DIAGNOSIS — Z12.11 SCREEN FOR COLON CANCER: ICD-10-CM

## 2025-02-19 DIAGNOSIS — Z11.4 SCREENING FOR HIV (HUMAN IMMUNODEFICIENCY VIRUS): ICD-10-CM

## 2025-02-19 DIAGNOSIS — I63.9 CEREBROVASCULAR ACCIDENT (CVA), UNSPECIFIED MECHANISM (H): ICD-10-CM

## 2025-02-19 DIAGNOSIS — E66.01 CLASS 2 SEVERE OBESITY DUE TO EXCESS CALORIES WITH SERIOUS COMORBIDITY AND BODY MASS INDEX (BMI) OF 35.0 TO 35.9 IN ADULT (H): ICD-10-CM

## 2025-02-19 DIAGNOSIS — E66.812 CLASS 2 SEVERE OBESITY DUE TO EXCESS CALORIES WITH SERIOUS COMORBIDITY AND BODY MASS INDEX (BMI) OF 35.0 TO 35.9 IN ADULT (H): ICD-10-CM

## 2025-02-19 DIAGNOSIS — E78.2 MIXED HYPERLIPIDEMIA: ICD-10-CM

## 2025-02-19 DIAGNOSIS — E11.9 TYPE 2 DIABETES MELLITUS WITHOUT COMPLICATION, WITHOUT LONG-TERM CURRENT USE OF INSULIN (H): Primary | ICD-10-CM

## 2025-02-19 DIAGNOSIS — E11.9 TYPE 2 DIABETES MELLITUS WITHOUT COMPLICATION, WITHOUT LONG-TERM CURRENT USE OF INSULIN (H): ICD-10-CM

## 2025-02-19 DIAGNOSIS — I10 ESSENTIAL HYPERTENSION: ICD-10-CM

## 2025-02-19 DIAGNOSIS — Z00.01 ENCOUNTER FOR ROUTINE ADULT HEALTH EXAMINATION WITH ABNORMAL FINDINGS: Primary | ICD-10-CM

## 2025-02-19 LAB
CHOLEST SERPL-MCNC: 151 MG/DL
CREAT UR-MCNC: 93.9 MG/DL
EST. AVERAGE GLUCOSE BLD GHB EST-MCNC: 180 MG/DL
FASTING STATUS PATIENT QL REPORTED: NO
HBA1C MFR BLD: 7.9 % (ref 0–5.6)
HDLC SERPL-MCNC: 46 MG/DL
LDLC SERPL CALC-MCNC: 70 MG/DL
MICROALBUMIN UR-MCNC: <12 MG/L
MICROALBUMIN/CREAT UR: NORMAL MG/G{CREAT}
NONHDLC SERPL-MCNC: 105 MG/DL
TRIGL SERPL-MCNC: 174 MG/DL

## 2025-02-19 PROCEDURE — 82570 ASSAY OF URINE CREATININE: CPT | Performed by: NURSE PRACTITIONER

## 2025-02-19 PROCEDURE — 99396 PREV VISIT EST AGE 40-64: CPT | Performed by: NURSE PRACTITIONER

## 2025-02-19 PROCEDURE — G2211 COMPLEX E/M VISIT ADD ON: HCPCS | Performed by: NURSE PRACTITIONER

## 2025-02-19 PROCEDURE — 80061 LIPID PANEL: CPT | Performed by: NURSE PRACTITIONER

## 2025-02-19 PROCEDURE — 83036 HEMOGLOBIN GLYCOSYLATED A1C: CPT | Performed by: NURSE PRACTITIONER

## 2025-02-19 PROCEDURE — 82043 UR ALBUMIN QUANTITATIVE: CPT | Performed by: NURSE PRACTITIONER

## 2025-02-19 PROCEDURE — 36415 COLL VENOUS BLD VENIPUNCTURE: CPT | Performed by: NURSE PRACTITIONER

## 2025-02-19 PROCEDURE — 99214 OFFICE O/P EST MOD 30 MIN: CPT | Mod: 25 | Performed by: NURSE PRACTITIONER

## 2025-02-19 RX ORDER — LEVETIRACETAM 500 MG/1
500 TABLET ORAL 2 TIMES DAILY
Qty: 180 TABLET | Refills: 3 | Status: SHIPPED | OUTPATIENT
Start: 2025-02-19

## 2025-02-19 SDOH — HEALTH STABILITY: PHYSICAL HEALTH: ON AVERAGE, HOW MANY MINUTES DO YOU ENGAGE IN EXERCISE AT THIS LEVEL?: 30 MIN

## 2025-02-19 SDOH — HEALTH STABILITY: PHYSICAL HEALTH: ON AVERAGE, HOW MANY DAYS PER WEEK DO YOU ENGAGE IN MODERATE TO STRENUOUS EXERCISE (LIKE A BRISK WALK)?: 3 DAYS

## 2025-02-19 ASSESSMENT — PAIN SCALES - GENERAL: PAINLEVEL_OUTOF10: NO PAIN (0)

## 2025-02-19 ASSESSMENT — SOCIAL DETERMINANTS OF HEALTH (SDOH): HOW OFTEN DO YOU GET TOGETHER WITH FRIENDS OR RELATIVES?: ONCE A WEEK

## 2025-02-19 NOTE — ASSESSMENT & PLAN NOTE
Lab Results   Component Value Date    A1C 9.0 09/26/2024    A1C 6.8 05/02/2024    A1C 7.9 01/02/2024    A1C 7.6 07/11/2023    A1C 8.6 05/02/2022

## 2025-02-19 NOTE — PROGRESS NOTES
Preventive Care Visit  [facility] YAQUELIN IVERSON [place], APRN CNP, Nurse Practitioner  Feb 19, 2025  {Provider  Link to [facility] :230956}    {PROVIDER CHARTING PREFERENCE:671100}    Raina Low is a 64 year old, presenting for the following:  Physical, Diabetes, Lipids, and Hypertension  Questions about her Keppra (levtiractam           HPI    Nutrition: eating proteins, trying to limit the carbohydrates   Exercise/movement:30 minutes   Sleep 7-8 hours a night       Family history  updated  Health Care Directive  Patient does not have a Health Care Directive: Discussed advance care planning with patient; however, patient declined at this time.      2/19/2025   General Health   How would you rate your overall physical health? Good   Feel stress (tense, anxious, or unable to sleep) Not at all         2/19/2025   Nutrition   Three or more servings of calcium each day? Yes   Diet: Regular (no restrictions)   How many servings of fruit and vegetables per day? (!) 2-3   How many sweetened beverages each day? 0-1         2/19/2025   Exercise   Days per week of moderate/strenous exercise 3 days   Average minutes spent exercising at this level 30 min         2/19/2025   Social Factors   Frequency of gathering with friends or relatives Once a week   Worry food won't last until get money to buy more No   Food not last or not have enough money for food? No   Do you have housing? (Housing is defined as stable permanent housing and does not include staying ouside in a car, in a tent, in an abandoned building, in an overnight shelter, or couch-surfing.) Yes   Are you worried about losing your housing? No   Lack of transportation? No   Unable to get utilities (heat,electricity)? No         2/19/2025   Fall Risk   Fallen 2 or more times in the past year? No   Trouble with walking or balance? No          2/19/2025   Dental   Dentist two times every year? Yes            Today's PHQ-2 Score:       2/19/2025      3:02 PM   PHQ-2 (  [employer])   Q1: Little interest or pleasure in doing things 0   Q2: Feeling down, depressed or hopeless 0   PHQ-2 Score 0    Q1: Little interest or pleasure in doing things Not at all   Q2: Feeling down, depressed or hopeless Not at all   PHQ-2 Score 0       Patient-reported           2025   Substance Use   Alcohol more than 3/day or more than 7/wk No   Do you use any other substances recreationally? No     Social History     Tobacco Use    Smoking status: Never    Smokeless tobacco: Never   Vaping Use    Vaping status: Never Used       Mammogram Screening - {Patient age 40-74:518826}          2025   One time HIV Screening   Previous HIV test? No         2025   STI Screening   New sexual partner(s) since last STI/HIV test? No     History of abnormal Pap smear: { :925188}       ASCVD Risk   The ASCVD Risk score (Malachi LEARY, et al., 2019) failed to calculate for the following reasons:    Risk score cannot be calculated because patient has a medical history suggesting prior/existing ASCVD    Fracture Risk Assessment Tool  Link to Frax Calculator  Use the information below to complete the Frax calculator  : 1960  Sex: female  Weight (kg): 85.7 kg (actual weight)  Height (cm): 154.9 cm  Previous Fragility Fracture:  No  History of parent with fractured hip:  {YES/NO :202642}  Current Smoking:  No  Patient has been on glucocorticoids for more than 3 months (5mg/day or more): {YES/NO :433691}  Rheumatoid Arthritis on Problem List:  No  Secondary Osteoporosis on Problem List:  No  Consumes 3 or more units of alcohol per day: {YES/NO :083480}  Femoral Neck BMD (g/cm2)  {Link to FRAX Calculated Score Flowsheet  After results are documented in flowsheet, refresh note to pull results into note :024252} {10-year probability of a hip fracture >= 3% or a major osteoporosis-related fracture >= 20% may indicate treatment:712237}     {Provider  REQUIRED FOR AW[facility] Use the storyboard  "to review patient history, after sections have been marked as reviewed, refresh note to capture documentation:740413}   Reviewed and updated as needed this visit by Provider                    No past medical history on file.  No past surgical history on file.      Review of Systems  {RADHA roland stripe:920080}     Objective    Exam  /80 (BP Location: Right arm, Patient Position: Sitting, Cuff Size: Adult Large)   Pulse 76   Temp 98.5  F (36.9  C) (Tympanic)   Resp 16   Ht 1.549 m (5' 1\")   Wt 85.7 kg (189 lb)   SpO2 98%   BMI 35.71 kg/m     Estimated body mass index is 35.71 kg/m  as calculated from the following:    Height as of this encounter: 1.549 m (5' 1\").    Weight as of this encounter: 85.7 kg (189 lb).    Physical Exam  Constitutional:       Appearance: Normal appearance.   HENT:      Head: Normocephalic.      Right Ear: Tympanic membrane, ear canal and external ear normal.      Left Ear: Tympanic membrane, ear canal and external ear normal.      Nose: Nose normal.      Mouth/Throat:      Mouth: Mucous membranes are moist.      Pharynx: Oropharynx is clear.      Comments: Mallampati score + ***  Eyes:      Extraocular Movements: Extraocular movements intact.      Conjunctiva/sclera: Conjunctivae normal.      Pupils: Pupils are equal, round, and reactive to light.   Neck:      Thyroid: No thyroid mass, thyromegaly or thyroid tenderness.      Trachea: Trachea normal.   Cardiovascular:      Rate and Rhythm: Normal rate and regular rhythm.      Heart sounds: Normal heart sounds.   Pulmonary:      Effort: Pulmonary effort is normal.      Breath sounds: Normal breath sounds.   Abdominal:      General: Abdomen is flat. Bowel sounds are normal.   Musculoskeletal:         General: Normal range of motion.      Cervical back: Normal range of motion.   Lymphadenopathy:      Cervical: No cervical adenopathy.   Skin:     General: Skin is warm and dry.   Neurological:      Mental Status: She is alert and " oriented to person, place, and time.   Psychiatric:         Mood and Affect: Mood normal.         Behavior: Behavior normal.         Thought Content: Thought content normal.         Judgment: Judgment normal.               Signed Electronically by: DUC Paul CNP  {Email feedback regarding this note to [email or web address]   :607305}   "affect     Objective    Exam  /80 (BP Location: Right arm, Patient Position: Sitting, Cuff Size: Adult Large)   Pulse 76   Temp 98.5  F (36.9  C) (Tympanic)   Resp 16   Ht 1.549 m (5' 1\")   Wt 85.7 kg (189 lb)   SpO2 98%   BMI 35.71 kg/m     Estimated body mass index is 35.71 kg/m  as calculated from the following:    Height as of this encounter: 1.549 m (5' 1\").    Weight as of this encounter: 85.7 kg (189 lb).    Physical Exam  Constitutional:       Appearance: Normal appearance.   HENT:      Head: Normocephalic.      Right Ear: Tympanic membrane, ear canal and external ear normal.      Left Ear: Tympanic membrane, ear canal and external ear normal.      Nose: Nose normal.      Mouth/Throat:      Mouth: Mucous membranes are moist.      Pharynx: Oropharynx is clear.   Eyes:      Extraocular Movements: Extraocular movements intact.      Conjunctiva/sclera: Conjunctivae normal.      Pupils: Pupils are equal, round, and reactive to light.   Neck:      Thyroid: No thyroid mass, thyromegaly or thyroid tenderness.      Trachea: Trachea normal.   Cardiovascular:      Rate and Rhythm: Normal rate and regular rhythm.      Heart sounds: Normal heart sounds.   Pulmonary:      Effort: Pulmonary effort is normal.      Breath sounds: Normal breath sounds.   Abdominal:      General: Abdomen is flat. Bowel sounds are normal.   Musculoskeletal:         General: Normal range of motion.      Cervical back: Normal range of motion.   Lymphadenopathy:      Cervical: No cervical adenopathy.   Skin:     General: Skin is warm and dry.   Neurological:      Mental Status: She is alert and oriented to person, place, and time.   Psychiatric:         Mood and Affect: Mood normal.         Behavior: Behavior normal.         Thought Content: Thought content normal.         Judgment: Judgment normal.               Signed Electronically by: DUC Paul CNP    "

## 2025-02-19 NOTE — ASSESSMENT & PLAN NOTE
BP Readings from Last 3 Encounters:   02/19/25 124/80   09/26/24 139/88   07/03/24 124/80     Medication: Lisinopril 2.5 mg once daily.

## 2025-02-22 RX ORDER — DAPAGLIFLOZIN 10 MG/1
10 TABLET, FILM COATED ORAL DAILY
Qty: 90 TABLET | Refills: 3 | Status: SHIPPED | OUTPATIENT
Start: 2025-02-22

## 2025-02-22 NOTE — ASSESSMENT & PLAN NOTE
Medication: Keppra 500 mg daily no recent seizures refills given to bridge until she is seen by neurology  Has an appointment with a new neurologist this spring within [facility]

## 2025-02-22 NOTE — ASSESSMENT & PLAN NOTE
Recent Labs   Lab Test 02/19/25  1634 01/02/24  1326   CHOL 151 152   HDL 46* 48*   LDL 70 76   TRIG 174* 141     LDL-C is at goal, triglycerides are still elevated  Medication: Rosuvastatin 10 mg

## 2025-02-22 NOTE — ASSESSMENT & PLAN NOTE
Wt Readings from Last 4 Encounters:   02/19/25 85.7 kg (189 lb)   09/26/24 87.1 kg (192 lb)   07/03/24 90.3 kg (199 lb)   05/02/24 94.8 kg (209 lb)     Weight continue to decrease slowly.  Phenotype: Metabolic dysregulation postmenopause  Comorbid conditions include diabetes type 2, hyperlipidemia, hypertension.  Nutrition: they are trying to improve the quality of her food she is eating  Exercise: she walks around their house, they do have a stationary bike that she uses during the day 30 minutes per day  Medication: GLP1 agonist Rybelsus 7 mg

## 2025-04-30 ENCOUNTER — LAB (OUTPATIENT)
Dept: LAB | Facility: HOSPITAL | Age: 65
End: 2025-04-30
Payer: COMMERCIAL

## 2025-04-30 ENCOUNTER — OFFICE VISIT (OUTPATIENT)
Dept: NEUROLOGY | Facility: CLINIC | Age: 65
End: 2025-04-30
Attending: NURSE PRACTITIONER
Payer: COMMERCIAL

## 2025-04-30 VITALS
HEART RATE: 79 BPM | SYSTOLIC BLOOD PRESSURE: 119 MMHG | BODY MASS INDEX: 35.37 KG/M2 | DIASTOLIC BLOOD PRESSURE: 77 MMHG | WEIGHT: 187.2 LBS

## 2025-04-30 DIAGNOSIS — G40.909 SEIZURE DISORDER (H): ICD-10-CM

## 2025-04-30 DIAGNOSIS — I63.9 CEREBROVASCULAR ACCIDENT (CVA), UNSPECIFIED MECHANISM (H): Primary | ICD-10-CM

## 2025-04-30 DIAGNOSIS — E11.49 TYPE 2 DIABETES MELLITUS WITH OTHER NEUROLOGIC COMPLICATION, WITHOUT LONG-TERM CURRENT USE OF INSULIN (H): ICD-10-CM

## 2025-04-30 DIAGNOSIS — Z79.899 ENCOUNTER FOR LONG-TERM (CURRENT) USE OF MEDICATIONS: ICD-10-CM

## 2025-04-30 LAB
ANION GAP SERPL CALCULATED.3IONS-SCNC: 12 MMOL/L (ref 7–15)
BUN SERPL-MCNC: 18.4 MG/DL (ref 8–23)
CALCIUM SERPL-MCNC: 10.4 MG/DL (ref 8.8–10.4)
CHLORIDE SERPL-SCNC: 102 MMOL/L (ref 98–107)
CREAT SERPL-MCNC: 0.83 MG/DL (ref 0.51–0.95)
EGFRCR SERPLBLD CKD-EPI 2021: 78 ML/MIN/1.73M2
EST. AVERAGE GLUCOSE BLD GHB EST-MCNC: 157 MG/DL
GLUCOSE SERPL-MCNC: 121 MG/DL (ref 70–99)
HBA1C MFR BLD: 7.1 %
HCO3 SERPL-SCNC: 29 MMOL/L (ref 22–29)
LEVETIRACETAM SERPL-MCNC: 12.9 ÂΜG/ML (ref 10–40)
POTASSIUM SERPL-SCNC: 4.4 MMOL/L (ref 3.4–5.3)
SODIUM SERPL-SCNC: 143 MMOL/L (ref 135–145)

## 2025-04-30 PROCEDURE — 80177 DRUG SCRN QUAN LEVETIRACETAM: CPT

## 2025-04-30 PROCEDURE — 80048 BASIC METABOLIC PNL TOTAL CA: CPT

## 2025-04-30 PROCEDURE — 83036 HEMOGLOBIN GLYCOSYLATED A1C: CPT

## 2025-04-30 PROCEDURE — 36415 COLL VENOUS BLD VENIPUNCTURE: CPT

## 2025-04-30 NOTE — RESULT ENCOUNTER NOTE
Jessie Quiroga. Wanted to route these results to you as you most recently saw her in February 2025.

## 2025-04-30 NOTE — PROGRESS NOTES
INITIAL NEUROLOGY CONSULTATION    DATE OF VISIT: 4/30/2025  MRN: [id number]  PATIENT NAME: Maranda Thrasher  YOB: 1960    REFERRING PROVIDER: Merlyn Nguyen*    Chief Complaint   Patient presents with    Stroke     Had mini stroke, last stroke was in 2008.   Since the stroke, patient R eye peripheral vision issues.     Seizures     Establish care to be able to get refill for her medication.   Haven't had a seizure since 2012.       SUBJECTIVE:                                                      HPI:   Maranda Thrasher is a 64 year old female whom I have been asked by Jessie Shoemaker  to see in consultation for history of stroke and seizures.    Maranda is here with her , Anton, who provides most of the history.  In 2008 she had multipe TIAs. She was treated for vasculitis with chemotherapy.   She was started on Keppra after this and stopped it herself because it was unclear if there was really any seizure history.   Then in 2012 she had a tonic event on [employer]. Since then she has been on Keppra and doing well, no additional seizures.    She previously followed at [facility] but this provider moved further away from their home as the day.  So they are trying to establish care a little bit closer to home for convenience.  Per chart review she has a right visual field cut (patient confirms this) since the stroke in 2008.  She was discharged from [facility] on Plavix and aspirin but had stopped the Plavix and was continuing aspirin monotherapy.  Blood pressure has been an issue in the past but has improved.  Biopsy for vasculitis was apparently negative and it sounds like the stroke occurred during a biopsy of her brain looking for vasculitis.  She was treated with steroids according to the chart.  No definite seizure activity during hospitalization but she was sent with the Keppra initially for prophylactic purposes.  A little bit of slowness on the left was noted by her previous neurologist.  She has  been referred back to her primary for workup regarding possible systemic vasculitis.    In 2008 she was referred for speech therapy, multiple strokes mentioned.  She had to do some tube feeding for a while after the strokes as well.  There is mention of some cognitive deficits.    Possible family history of stroke in mother.  No family history of seizures.    No past medical history on file.  No past surgical history on file.    Current Outpatient Medications   Medication Sig Dispense Refill    aspirin 81 MG EC tablet [ASPIRIN 81 MG EC TABLET] Take 81 mg by mouth daily.      blood-glucose meter Misc [BLOOD-GLUCOSE METER MISC] Used to check blood sugar once daily for management of type 2 diabetes 1 each 0    dapagliflozin (FARXIGA) 10 MG TABS tablet Take 1 tablet (10 mg) by mouth daily. 90 tablet 3    dapagliflozin (FARXIGA) 5 MG TABS tablet Take 1 tablet (5 mg) by mouth daily. 90 tablet 2    generic lancets (FINGERSTIX LANCETS) [GENERIC LANCETS (FINGERSTIX LANCETS)] Used to check blood sugar once daily for management of type 2 diabetes. dispense brand per patient's insurance at pharmacy discretion. 100 each 11    glipiZIDE (GLUCOTROL XL) 5 MG 24 hr tablet Take 1 tablet (5 mg) by mouth daily 90 tablet 3    levETIRAcetam (KEPPRA) 500 MG tablet Take 1 tablet (500 mg) by mouth 2 times daily. 180 tablet 3    lisinopril (ZESTRIL) 2.5 MG tablet TAKE 1 TABLET BY MOUTH EVERY DAY 90 tablet 0    rosuvastatin (CRESTOR) 10 MG tablet Take 1 tablet (10 mg) by mouth daily 90 tablet 2    Semaglutide (RYBELSUS) 7 MG tablet Take 1 tablet (7 mg) by mouth daily. 90 tablet 3    blood glucose test (ACCU-CHEK CIARA PLUS TEST STRP) strips [BLOOD GLUCOSE TEST (ACCU-CHEK CIARA PLUS TEST STRP) STRIPS] Used to check blood sugar once daily for management of type 2 diabetes.  Dispense brand per patient's insurance at pharmacy discretion. (Patient not taking: Reported on 4/30/2025) 100 strip 11     No current facility-administered medications for  this visit.     Allergies   Allergen Reactions    Metformin Diarrhea    Penicillins Unknown        Problem (# of Occurrences) Relation (Name,Age of Onset)    CABG (1) Brother    Lung Cancer (1) Father    Diabetes Type 2  (1) Mother          Social History     Tobacco Use    Smoking status: Never    Smokeless tobacco: Never   Vaping Use    Vaping status: Never Used       REVIEW OF SYSTEMS:                                                      10-point review of systems is negative except as mentioned above in HPI.     EXAM:                                                      Physical Exam:   Vitals: /77   Pulse 79   Wt 84.9 kg (187 lb 3.2 oz)   BMI 35.37 kg/m    BMI= Body mass index is 35.37 kg/m .  GENERAL: NAD.  HEENT: NC/AT.   CV: RRR. S1, S2.   NECK: No bruits.  PULM: Non-labored breathing.   Neurologic:  MENTAL STATUS: Alert, attentive. Speech is slow, slightly dysarthric. Fair comprehension, processing speed is slow. Normal concentration. Fair fund of knowledge, relies on .   CRANIAL NERVES:  Right field cut. Pupils equally, round and reactive to light. Facial sensation and movement normal. EOM full. Hearing intact to conversation. Sternocleidomastoids and trapezius strength intact. Palate moves symmetrically. Tongue midline.  MOTOR: 5/5 in proximal and distal muscle groups of upper and lower extremities. Tone and bulk normal.   DTRs: Intact and symmetric in biceps, BR; Brisk at patellae.  Babinski down-going bilaterally.   SENSATION: Normal light touch and pinprick. Intact proprioception at great toes. Vibration: Normal at both ankles.   COORDINATION: Slight tremor with action on the left and slowness with finger tapping.  Right-sided coordination normal.    STATION AND GAIT: Romberg negative.  Casual gait is normal.  Right hand-dominant.    Relevant Data:  MRI/MRA (11.18.2008):  Impression:   1. Relatively large region of involuting infarction within the left   parietal and occipital lobes.  There are, however, several small foci of   suspected acute/subacute infarct within this region. There are no foci of   acute infarction elsewhere in the brain.   2. High-grade stenosis within the M1 segment of the left middle cerebral   artery which was not present on 8/6/2008 CT angiogram study but appears   similar to the 7/23/2008 head MRA study. This suggests a new stenosis or   restenosis. There is no evidence of acute infarction in this vascular   distribution.   3. Normal neck MRA without and with contrast.     MRA Neck (7.15.2009):  CONCLUSION:     1) No evidence for hemodynamically significant carotid or vertebral artery   stenosis.   2) Mild-to-moderate stenosis proximal left external carotid artery.     Images not currently available for my review.    ASSESSMENT and PLAN:                                                      Assessment:     ICD-10-CM    1. Cerebrovascular accident (CVA), unspecified mechanism (H)  I63.9 Adult Neurology  Referral      2. Seizure disorder (H)  G40.909 Keppra (Levetiracetam) Level      3. Encounter for long-term (current) use of medications  Z79.899           Ms. Thrasher is a pleasant 64-year-old woman with history of stroke, workup for vasculitis, here to establish care for seizures.  She is doing well, seizure-free since being on Keppra which was restarted in about 2012.  No specific concerns for me today.  I may need some more information about the vasculitis workup although given that this occurred a long time ago I am not sure if records are available.  Would request that primary provider relay any additional information they may have to help take care of Maranda.  In the meantime we will continue the Keppra and check a level.  We will plan to follow-up in 6 months.    Plan:  Continue the Keppra: 500mg twice daily.  Keppra level.  We will notify you of the results.  I do not think we need to do any additional tests or add any other medications at this time.  I  would like to see you back in about 6 months to check-in.  After that we can probably follow-up annually if everything is stable.    Total Time: 62 minutes were spent with the patient and in chart review/documentation (as described above in Assessment and Plan) /coordinating the care on date of service.    Merlyn Ojeda MD  Neurology    CC: HE[employer] software used in the dictation of this note.

## 2025-04-30 NOTE — LETTER
4/30/2025      Maranda Thrasher  [address] [facility] [place] [address]      Dear Colleague,    Thank you for referring your patient, Maranda Thrasher, to the [facility] [place]. Please see a copy of my visit note below.    INITIAL NEUROLOGY CONSULTATION    DATE OF VISIT: 4/30/2025  MRN: [id number]  PATIENT NAME: Maranda Thrasher  YOB: 1960    REFERRING PROVIDER: Merlyn Multani    Chief Complaint   Patient presents with     Stroke     Had mini stroke, last stroke was in 2008.   Since the stroke, patient R eye peripheral vision issues.      Seizures     Establish care to be able to get refill for her medication.   Haven't had a seizure since 2012.       SUBJECTIVE:                                                      HPI:   Maranda Thrasher is a 64 year old female whom I have been asked by Jessie Shoemaker  to see in consultation for history of stroke and seizures.    Maranda is here with her , Anton, who provides most of the history.  In 2008 she had multipe TIAs. She was treated for vasculitis with chemotherapy.   She was started on Keppra after this and stopped it herself because it was unclear if there was really any seizure history.   Then in 2012 she had a tonic event on [employer]. Since then she has been on Keppra and doing well, no additional seizures.    She previously followed at [facility] but this provider moved further away from their home as the day.  So they are trying to establish care a little bit closer to home for convenience.  Per chart review she has a right visual field cut (patient confirms this) since the stroke in 2008.  She was discharged from [facility] on Plavix and aspirin but had stopped the Plavix and was continuing aspirin monotherapy.  Blood pressure has been an issue in the past but has improved.  Biopsy for vasculitis was apparently negative and it sounds like the stroke occurred during a biopsy of her brain looking for vasculitis.  She was treated with  steroids according to the chart.  No definite seizure activity during hospitalization but she was sent with the Keppra initially for prophylactic purposes.  A little bit of slowness on the left was noted by her previous neurologist.  She has been referred back to her primary for workup regarding possible systemic vasculitis.    In 2008 she was referred for speech therapy, multiple strokes mentioned.  She had to do some tube feeding for a while after the strokes as well.  There is mention of some cognitive deficits.    Possible family history of stroke in mother.  No family history of seizures.    No past medical history on file.  No past surgical history on file.    Current Outpatient Medications   Medication Sig Dispense Refill     aspirin 81 MG EC tablet [ASPIRIN 81 MG EC TABLET] Take 81 mg by mouth daily.       blood-glucose meter Misc [BLOOD-GLUCOSE METER MISC] Used to check blood sugar once daily for management of type 2 diabetes 1 each 0     dapagliflozin (FARXIGA) 10 MG TABS tablet Take 1 tablet (10 mg) by mouth daily. 90 tablet 3     dapagliflozin (FARXIGA) 5 MG TABS tablet Take 1 tablet (5 mg) by mouth daily. 90 tablet 2     generic lancets (FINGERSTIX LANCETS) [GENERIC LANCETS (FINGERSTIX LANCETS)] Used to check blood sugar once daily for management of type 2 diabetes. dispense brand per patient's insurance at pharmacy discretion. 100 each 11     glipiZIDE (GLUCOTROL XL) 5 MG 24 hr tablet Take 1 tablet (5 mg) by mouth daily 90 tablet 3     levETIRAcetam (KEPPRA) 500 MG tablet Take 1 tablet (500 mg) by mouth 2 times daily. 180 tablet 3     lisinopril (ZESTRIL) 2.5 MG tablet TAKE 1 TABLET BY MOUTH EVERY DAY 90 tablet 0     rosuvastatin (CRESTOR) 10 MG tablet Take 1 tablet (10 mg) by mouth daily 90 tablet 2     Semaglutide (RYBELSUS) 7 MG tablet Take 1 tablet (7 mg) by mouth daily. 90 tablet 3     blood glucose test (ACCU-CHEK CIARA PLUS TEST STRP) strips [BLOOD GLUCOSE TEST (ACCU-CHEK CIARA PLUS TEST STRP)  STRIPS] Used to check blood sugar once daily for management of type 2 diabetes.  Dispense brand per patient's insurance at pharmacy discretion. (Patient not taking: Reported on 4/30/2025) 100 strip 11     No current facility-administered medications for this visit.     Allergies   Allergen Reactions     Metformin Diarrhea     Penicillins Unknown        Problem (# of Occurrences) Relation (Name,Age of Onset)    CABG (1) Brother    Lung Cancer (1) Father    Diabetes Type 2  (1) Mother          Social History     Tobacco Use     Smoking status: Never     Smokeless tobacco: Never   Vaping Use     Vaping status: Never Used       REVIEW OF SYSTEMS:                                                      10-point review of systems is negative except as mentioned above in HPI.     EXAM:                                                      Physical Exam:   Vitals: /77   Pulse 79   Wt 84.9 kg (187 lb 3.2 oz)   BMI 35.37 kg/m    BMI= Body mass index is 35.37 kg/m .  GENERAL: NAD.  HEENT: NC/AT.   CV: RRR. S1, S2.   NECK: No bruits.  PULM: Non-labored breathing.   Neurologic:  MENTAL STATUS: Alert, attentive. Speech is slow, slightly dysarthric. Fair comprehension, processing speed is slow. Normal concentration. Fair fund of knowledge, relies on .   CRANIAL NERVES:  Right field cut. Pupils equally, round and reactive to light. Facial sensation and movement normal. EOM full. Hearing intact to conversation. Sternocleidomastoids and trapezius strength intact. Palate moves symmetrically. Tongue midline.  MOTOR: 5/5 in proximal and distal muscle groups of upper and lower extremities. Tone and bulk normal.   DTRs: Intact and symmetric in biceps, BR; Brisk at patellae.  Babinski down-going bilaterally.   SENSATION: Normal light touch and pinprick. Intact proprioception at great toes. Vibration: Normal at both ankles.   COORDINATION: Slight tremor with action on the left and slowness with finger tapping.  Right-sided  coordination normal.    STATION AND GAIT: Romberg negative.  Casual gait is normal.  Right hand-dominant.    Relevant Data:  MRI/MRA (11.18.2008):  Impression:   1. Relatively large region of involuting infarction within the left   parietal and occipital lobes. There are, however, several small foci of   suspected acute/subacute infarct within this region. There are no foci of   acute infarction elsewhere in the brain.   2. High-grade stenosis within the M1 segment of the left middle cerebral   artery which was not present on 8/6/2008 CT angiogram study but appears   similar to the 7/23/2008 head MRA study. This suggests a new stenosis or   restenosis. There is no evidence of acute infarction in this vascular   distribution.   3. Normal neck MRA without and with contrast.     MRA Neck (7.15.2009):  CONCLUSION:     1) No evidence for hemodynamically significant carotid or vertebral artery   stenosis.   2) Mild-to-moderate stenosis proximal left external carotid artery.     Images not currently available for my review.    ASSESSMENT and PLAN:                                                      Assessment:     ICD-10-CM    1. Cerebrovascular accident (CVA), unspecified mechanism (H)  I63.9 Adult Neurology  Referral      2. Seizure disorder (H)  G40.909 Keppra (Levetiracetam) Level      3. Encounter for long-term (current) use of medications  Z79.899           Ms. Thrasher is a pleasant 64-year-old woman with history of stroke, workup for vasculitis, here to establish care for seizures.  She is doing well, seizure-free since being on Keppra which was restarted in about 2012.  No specific concerns for me today.  I may need some more information about the vasculitis workup although given that this occurred a long time ago I am not sure if records are available.  Would request that primary provider relay any additional information they may have to help take care of Maranda.  In the meantime we will continue the  Keppra and check a level.  We will plan to follow-up in 6 months.    Plan:  Continue the Keppra: 500mg twice daily.  Keppra level.  We will notify you of the results.  I do not think we need to do any additional tests or add any other medications at this time.  I would like to see you back in about 6 months to check-in.  After that we can probably follow-up annually if everything is stable.    Total Time: 62 minutes were spent with the patient and in chart review/documentation (as described above in Assessment and Plan) /coordinating the care on date of service.    Merlyn Ojeda MD  Neurology    CC: Jessie Shoemaker CNP    Dragon software used in the dictation of this note.      Again, thank you for allowing me to participate in the care of your patient.        Sincerely,        Merlyn Ojeda MD    Electronically signed

## 2025-04-30 NOTE — PATIENT INSTRUCTIONS
Plan:  Continue the Keppra: 500mg twice daily.  Keppra level.  We will notify you of the results.  I do not think we need to do any additional tests or add any other medications at this time.  I would like to see you back in about 6 months to check-in.  After that we can probably follow-up annually if everything is stable.

## 2025-05-08 ENCOUNTER — RESULTS FOLLOW-UP (OUTPATIENT)
Dept: NEUROLOGY | Facility: CLINIC | Age: 65
End: 2025-05-08

## 2025-05-10 DIAGNOSIS — E11.9 TYPE 2 DIABETES MELLITUS WITHOUT COMPLICATION, WITHOUT LONG-TERM CURRENT USE OF INSULIN (H): ICD-10-CM

## 2025-05-12 RX ORDER — LISINOPRIL 2.5 MG/1
2.5 TABLET ORAL DAILY
Qty: 90 TABLET | Refills: 2 | Status: SHIPPED | OUTPATIENT
Start: 2025-05-12

## 2025-05-21 DIAGNOSIS — E11.9 TYPE 2 DIABETES MELLITUS WITHOUT COMPLICATION, WITHOUT LONG-TERM CURRENT USE OF INSULIN (H): ICD-10-CM

## 2025-05-22 RX ORDER — GLIPIZIDE 5 MG/1
TABLET, FILM COATED, EXTENDED RELEASE ORAL
Qty: 90 TABLET | Refills: 0 | Status: SHIPPED | OUTPATIENT
Start: 2025-05-22

## 2025-06-10 DIAGNOSIS — E78.5 HYPERLIPIDEMIA, UNSPECIFIED HYPERLIPIDEMIA TYPE: ICD-10-CM

## 2025-06-10 DIAGNOSIS — E11.9 TYPE 2 DIABETES MELLITUS WITHOUT COMPLICATION, WITHOUT LONG-TERM CURRENT USE OF INSULIN (H): ICD-10-CM

## 2025-06-10 RX ORDER — ROSUVASTATIN CALCIUM 10 MG/1
10 TABLET, COATED ORAL DAILY
Qty: 90 TABLET | Refills: 2 | Status: SHIPPED | OUTPATIENT
Start: 2025-06-10

## 2025-08-20 DIAGNOSIS — E11.9 TYPE 2 DIABETES MELLITUS WITHOUT COMPLICATION, WITHOUT LONG-TERM CURRENT USE OF INSULIN (H): ICD-10-CM

## 2025-08-21 RX ORDER — GLIPIZIDE 5 MG/1
TABLET, FILM COATED, EXTENDED RELEASE ORAL
Qty: 90 TABLET | Refills: 0 | OUTPATIENT
Start: 2025-08-21

## 2025-09-01 RX ORDER — GLIPIZIDE 5 MG/1
5 TABLET, FILM COATED, EXTENDED RELEASE ORAL DAILY
Qty: 90 TABLET | Refills: 0 | Status: SHIPPED | OUTPATIENT
Start: 2025-09-01

## 2025-09-03 ENCOUNTER — TRANSFERRED RECORDS (OUTPATIENT)
Dept: HEALTH INFORMATION MANAGEMENT | Facility: CLINIC | Age: 65
End: 2025-09-03
Payer: COMMERCIAL